# Patient Record
Sex: MALE | Race: WHITE | NOT HISPANIC OR LATINO | Employment: FULL TIME
[De-identification: names, ages, dates, MRNs, and addresses within clinical notes are randomized per-mention and may not be internally consistent; named-entity substitution may affect disease eponyms.]

---

## 2018-04-23 ENCOUNTER — RECORDS - HEALTHEAST (OUTPATIENT)
Dept: ADMINISTRATIVE | Facility: OTHER | Age: 58
End: 2018-04-23

## 2018-08-30 ENCOUNTER — HOSPITAL ENCOUNTER (OUTPATIENT)
Dept: MRI IMAGING | Facility: CLINIC | Age: 58
Discharge: HOME | End: 2018-08-30
Attending: PSYCHIATRY & NEUROLOGY

## 2018-08-30 ENCOUNTER — COMMUNICATION - HEALTHEAST (OUTPATIENT)
Dept: TELEHEALTH | Facility: CLINIC | Age: 58
End: 2018-08-30

## 2018-08-30 DIAGNOSIS — G35 MULTIPLE SCLEROSIS (H): ICD-10-CM

## 2019-01-31 ENCOUNTER — RECORDS - HEALTHEAST (OUTPATIENT)
Dept: ADMINISTRATIVE | Facility: OTHER | Age: 59
End: 2019-01-31

## 2019-02-01 ENCOUNTER — HOSPITAL ENCOUNTER (OUTPATIENT)
Dept: MRI IMAGING | Facility: CLINIC | Age: 59
Discharge: HOME | End: 2019-02-01
Attending: PSYCHIATRY & NEUROLOGY

## 2019-02-01 DIAGNOSIS — G35 MULTIPLE SCLEROSIS (H): ICD-10-CM

## 2019-02-06 ENCOUNTER — COMMUNICATION - HEALTHEAST (OUTPATIENT)
Dept: NEUROSURGERY | Facility: CLINIC | Age: 59
End: 2019-02-06

## 2019-02-06 ENCOUNTER — AMBULATORY - HEALTHEAST (OUTPATIENT)
Dept: NEUROSURGERY | Facility: CLINIC | Age: 59
End: 2019-02-06

## 2019-02-06 DIAGNOSIS — M54.9 BACK PAIN: ICD-10-CM

## 2019-02-12 ENCOUNTER — RECORDS - HEALTHEAST (OUTPATIENT)
Dept: ADMINISTRATIVE | Facility: OTHER | Age: 59
End: 2019-02-12

## 2019-02-13 ENCOUNTER — OFFICE VISIT - HEALTHEAST (OUTPATIENT)
Dept: NEUROSURGERY | Facility: CLINIC | Age: 59
End: 2019-02-13

## 2019-02-13 ENCOUNTER — HOSPITAL ENCOUNTER (OUTPATIENT)
Dept: RADIOLOGY | Facility: CLINIC | Age: 59
Discharge: HOME | End: 2019-02-13
Attending: NEUROLOGICAL SURGERY

## 2019-02-13 DIAGNOSIS — M54.9 BACK PAIN: ICD-10-CM

## 2019-02-13 DIAGNOSIS — M48.062 SPINAL STENOSIS OF LUMBAR REGION WITH NEUROGENIC CLAUDICATION: ICD-10-CM

## 2019-02-13 ASSESSMENT — MIFFLIN-ST. JEOR: SCORE: 1730.71

## 2019-02-15 ENCOUNTER — COMMUNICATION - HEALTHEAST (OUTPATIENT)
Dept: INTERNAL MEDICINE | Facility: CLINIC | Age: 59
End: 2019-02-15

## 2019-02-18 ENCOUNTER — RECORDS - HEALTHEAST (OUTPATIENT)
Dept: ADMINISTRATIVE | Facility: OTHER | Age: 59
End: 2019-02-18

## 2019-02-21 ENCOUNTER — OFFICE VISIT - HEALTHEAST (OUTPATIENT)
Dept: INTERNAL MEDICINE | Facility: CLINIC | Age: 59
End: 2019-02-21

## 2019-02-21 ENCOUNTER — COMMUNICATION - HEALTHEAST (OUTPATIENT)
Dept: NEUROSURGERY | Facility: CLINIC | Age: 59
End: 2019-02-21

## 2019-02-21 DIAGNOSIS — M48.062 SPINAL STENOSIS OF LUMBAR REGION WITH NEUROGENIC CLAUDICATION: ICD-10-CM

## 2019-02-21 DIAGNOSIS — R53.82 CHRONIC FATIGUE: ICD-10-CM

## 2019-02-21 DIAGNOSIS — Z01.818 PRE-OPERATIVE EXAMINATION: ICD-10-CM

## 2019-02-21 DIAGNOSIS — G35 MULTIPLE SCLEROSIS (H): ICD-10-CM

## 2019-02-21 DIAGNOSIS — Z12.11 SCREEN FOR COLON CANCER: ICD-10-CM

## 2019-02-21 LAB
ANION GAP SERPL CALCULATED.3IONS-SCNC: 10 MMOL/L (ref 5–18)
ATRIAL RATE - MUSE: 67 BPM
BUN SERPL-MCNC: 19 MG/DL (ref 8–22)
CALCIUM SERPL-MCNC: 9.5 MG/DL (ref 8.5–10.5)
CHLORIDE BLD-SCNC: 105 MMOL/L (ref 98–107)
CO2 SERPL-SCNC: 26 MMOL/L (ref 22–31)
CREAT SERPL-MCNC: 0.87 MG/DL (ref 0.7–1.3)
DIASTOLIC BLOOD PRESSURE - MUSE: NORMAL MMHG
ERYTHROCYTE [DISTWIDTH] IN BLOOD BY AUTOMATED COUNT: 12.2 % (ref 11–14.5)
GFR SERPL CREATININE-BSD FRML MDRD: >60 ML/MIN/1.73M2
GLUCOSE BLD-MCNC: 92 MG/DL (ref 70–125)
HCT VFR BLD AUTO: 44.2 % (ref 40–54)
HGB BLD-MCNC: 15.4 G/DL (ref 14–18)
INR PPP: 0.98 (ref 0.9–1.1)
INTERPRETATION ECG - MUSE: NORMAL
MCH RBC QN AUTO: 31.2 PG (ref 27–34)
MCHC RBC AUTO-ENTMCNC: 34.8 G/DL (ref 32–36)
MCV RBC AUTO: 90 FL (ref 80–100)
P AXIS - MUSE: 70 DEGREES
PLATELET # BLD AUTO: 232 THOU/UL (ref 140–440)
PMV BLD AUTO: 7.4 FL (ref 7–10)
POTASSIUM BLD-SCNC: 3.9 MMOL/L (ref 3.5–5)
PR INTERVAL - MUSE: 148 MS
QRS DURATION - MUSE: 104 MS
QT - MUSE: 412 MS
QTC - MUSE: 435 MS
R AXIS - MUSE: 90 DEGREES
RBC # BLD AUTO: 4.92 MILL/UL (ref 4.4–6.2)
SODIUM SERPL-SCNC: 141 MMOL/L (ref 136–145)
SYSTOLIC BLOOD PRESSURE - MUSE: NORMAL MMHG
T AXIS - MUSE: 56 DEGREES
VENTRICULAR RATE- MUSE: 67 BPM
WBC: 7.7 THOU/UL (ref 4–11)

## 2019-02-21 ASSESSMENT — MIFFLIN-ST. JEOR: SCORE: 1717.11

## 2019-02-22 ENCOUNTER — COMMUNICATION - HEALTHEAST (OUTPATIENT)
Dept: INTERNAL MEDICINE | Facility: CLINIC | Age: 59
End: 2019-02-22

## 2019-02-25 ENCOUNTER — COMMUNICATION - HEALTHEAST (OUTPATIENT)
Dept: NEUROSURGERY | Facility: CLINIC | Age: 59
End: 2019-02-25

## 2019-02-26 ENCOUNTER — SURGERY - HEALTHEAST (OUTPATIENT)
Dept: SURGERY | Facility: CLINIC | Age: 59
End: 2019-02-26

## 2019-02-26 ENCOUNTER — ANESTHESIA - HEALTHEAST (OUTPATIENT)
Dept: SURGERY | Facility: CLINIC | Age: 59
End: 2019-02-26

## 2019-02-26 ASSESSMENT — MIFFLIN-ST. JEOR: SCORE: 1708.03

## 2019-02-27 ENCOUNTER — COMMUNICATION - HEALTHEAST (OUTPATIENT)
Dept: NEUROSURGERY | Facility: CLINIC | Age: 59
End: 2019-02-27

## 2019-03-12 ENCOUNTER — AMBULATORY - HEALTHEAST (OUTPATIENT)
Dept: NEUROSURGERY | Facility: CLINIC | Age: 59
End: 2019-03-12

## 2019-04-05 ENCOUNTER — OFFICE VISIT - HEALTHEAST (OUTPATIENT)
Dept: INTERNAL MEDICINE | Facility: CLINIC | Age: 59
End: 2019-04-05

## 2019-04-05 DIAGNOSIS — M48.062 SPINAL STENOSIS OF LUMBAR REGION WITH NEUROGENIC CLAUDICATION: ICD-10-CM

## 2019-04-05 DIAGNOSIS — Z13.220 SCREENING FOR HYPERLIPIDEMIA: ICD-10-CM

## 2019-04-05 DIAGNOSIS — Z12.5 SCREENING FOR PROSTATE CANCER: ICD-10-CM

## 2019-04-05 DIAGNOSIS — Z00.00 ROUTINE GENERAL MEDICAL EXAMINATION AT A HEALTH CARE FACILITY: ICD-10-CM

## 2019-04-05 DIAGNOSIS — Z12.2 ENCOUNTER FOR SCREENING FOR LUNG CANCER: ICD-10-CM

## 2019-04-05 DIAGNOSIS — R53.82 CHRONIC FATIGUE: ICD-10-CM

## 2019-04-05 DIAGNOSIS — Z23 IMMUNIZATION DUE: ICD-10-CM

## 2019-04-05 DIAGNOSIS — Z13.1 SCREENING FOR DIABETES MELLITUS: ICD-10-CM

## 2019-04-05 DIAGNOSIS — G35 MULTIPLE SCLEROSIS (H): ICD-10-CM

## 2019-04-05 DIAGNOSIS — Z11.59 NEED FOR HEPATITIS C SCREENING TEST: ICD-10-CM

## 2019-04-05 LAB
ALBUMIN UR-MCNC: NEGATIVE MG/DL
APPEARANCE UR: CLEAR
BILIRUB UR QL STRIP: NEGATIVE
CHOLEST SERPL-MCNC: 216 MG/DL
COLOR UR AUTO: YELLOW
FASTING STATUS PATIENT QL REPORTED: ABNORMAL
GLUCOSE UR STRIP-MCNC: NEGATIVE MG/DL
HBA1C MFR BLD: 5.1 % (ref 3.5–6)
HDLC SERPL-MCNC: 47 MG/DL
HGB UR QL STRIP: NEGATIVE
KETONES UR STRIP-MCNC: NEGATIVE MG/DL
LDLC SERPL CALC-MCNC: 125 MG/DL
LEUKOCYTE ESTERASE UR QL STRIP: NEGATIVE
NITRATE UR QL: NEGATIVE
PH UR STRIP: 7 [PH] (ref 5–8)
SP GR UR STRIP: 1.02 (ref 1–1.03)
TRIGL SERPL-MCNC: 220 MG/DL
TSH SERPL DL<=0.005 MIU/L-ACNC: 1.21 UIU/ML (ref 0.3–5)
UROBILINOGEN UR STRIP-ACNC: NORMAL

## 2019-04-05 ASSESSMENT — MIFFLIN-ST. JEOR: SCORE: 1753.39

## 2019-04-06 LAB
HCV AB SERPL QL IA: NEGATIVE
PSA SERPL-MCNC: 0.6 NG/ML (ref 0–3.5)

## 2019-04-09 ENCOUNTER — COMMUNICATION - HEALTHEAST (OUTPATIENT)
Dept: INTERNAL MEDICINE | Facility: CLINIC | Age: 59
End: 2019-04-09

## 2019-04-09 ENCOUNTER — HOSPITAL ENCOUNTER (OUTPATIENT)
Dept: CT IMAGING | Facility: HOSPITAL | Age: 59
Discharge: HOME | End: 2019-04-09
Attending: INTERNAL MEDICINE

## 2019-04-09 DIAGNOSIS — Z12.2 ENCOUNTER FOR SCREENING FOR LUNG CANCER: ICD-10-CM

## 2019-04-10 ENCOUNTER — COMMUNICATION - HEALTHEAST (OUTPATIENT)
Dept: INTERNAL MEDICINE | Facility: CLINIC | Age: 59
End: 2019-04-10

## 2019-04-10 DIAGNOSIS — E78.2 MIXED HYPERLIPIDEMIA: ICD-10-CM

## 2019-04-16 ENCOUNTER — COMMUNICATION - HEALTHEAST (OUTPATIENT)
Dept: NEUROSURGERY | Facility: CLINIC | Age: 59
End: 2019-04-16

## 2019-04-16 ENCOUNTER — OFFICE VISIT - HEALTHEAST (OUTPATIENT)
Dept: NEUROSURGERY | Facility: CLINIC | Age: 59
End: 2019-04-16

## 2019-04-16 DIAGNOSIS — M48.062 SPINAL STENOSIS OF LUMBAR REGION WITH NEUROGENIC CLAUDICATION: ICD-10-CM

## 2019-04-22 ENCOUNTER — OFFICE VISIT - HEALTHEAST (OUTPATIENT)
Dept: PHYSICAL THERAPY | Facility: REHABILITATION | Age: 59
End: 2019-04-22

## 2019-04-22 DIAGNOSIS — M48.062 SPINAL STENOSIS OF LUMBAR REGION WITH NEUROGENIC CLAUDICATION: ICD-10-CM

## 2019-04-22 DIAGNOSIS — M62.81 GENERALIZED MUSCLE WEAKNESS: ICD-10-CM

## 2019-04-24 ENCOUNTER — OFFICE VISIT - HEALTHEAST (OUTPATIENT)
Dept: PHYSICAL THERAPY | Facility: REHABILITATION | Age: 59
End: 2019-04-24

## 2019-04-24 DIAGNOSIS — M48.062 SPINAL STENOSIS OF LUMBAR REGION WITH NEUROGENIC CLAUDICATION: ICD-10-CM

## 2019-04-24 DIAGNOSIS — M62.81 GENERALIZED MUSCLE WEAKNESS: ICD-10-CM

## 2019-04-29 ENCOUNTER — OFFICE VISIT - HEALTHEAST (OUTPATIENT)
Dept: PHYSICAL THERAPY | Facility: REHABILITATION | Age: 59
End: 2019-04-29

## 2019-04-29 DIAGNOSIS — M48.062 SPINAL STENOSIS OF LUMBAR REGION WITH NEUROGENIC CLAUDICATION: ICD-10-CM

## 2019-04-29 DIAGNOSIS — M62.81 GENERALIZED MUSCLE WEAKNESS: ICD-10-CM

## 2019-05-01 ENCOUNTER — OFFICE VISIT - HEALTHEAST (OUTPATIENT)
Dept: PHYSICAL THERAPY | Facility: REHABILITATION | Age: 59
End: 2019-05-01

## 2019-05-01 DIAGNOSIS — M48.062 SPINAL STENOSIS OF LUMBAR REGION WITH NEUROGENIC CLAUDICATION: ICD-10-CM

## 2019-05-01 DIAGNOSIS — M62.81 GENERALIZED MUSCLE WEAKNESS: ICD-10-CM

## 2019-05-06 ENCOUNTER — OFFICE VISIT - HEALTHEAST (OUTPATIENT)
Dept: PHYSICAL THERAPY | Facility: REHABILITATION | Age: 59
End: 2019-05-06

## 2019-05-06 DIAGNOSIS — M48.062 SPINAL STENOSIS OF LUMBAR REGION WITH NEUROGENIC CLAUDICATION: ICD-10-CM

## 2019-05-06 DIAGNOSIS — M62.81 GENERALIZED MUSCLE WEAKNESS: ICD-10-CM

## 2019-05-08 ENCOUNTER — OFFICE VISIT - HEALTHEAST (OUTPATIENT)
Dept: PHYSICAL THERAPY | Facility: REHABILITATION | Age: 59
End: 2019-05-08

## 2019-05-08 DIAGNOSIS — M48.062 SPINAL STENOSIS OF LUMBAR REGION WITH NEUROGENIC CLAUDICATION: ICD-10-CM

## 2019-05-08 DIAGNOSIS — M62.81 GENERALIZED MUSCLE WEAKNESS: ICD-10-CM

## 2019-05-13 ENCOUNTER — OFFICE VISIT - HEALTHEAST (OUTPATIENT)
Dept: PHYSICAL THERAPY | Facility: REHABILITATION | Age: 59
End: 2019-05-13

## 2019-05-13 DIAGNOSIS — M62.81 GENERALIZED MUSCLE WEAKNESS: ICD-10-CM

## 2019-05-13 DIAGNOSIS — M48.062 SPINAL STENOSIS OF LUMBAR REGION WITH NEUROGENIC CLAUDICATION: ICD-10-CM

## 2019-05-14 ENCOUNTER — OFFICE VISIT - HEALTHEAST (OUTPATIENT)
Dept: NEUROSURGERY | Facility: CLINIC | Age: 59
End: 2019-05-14

## 2019-05-14 DIAGNOSIS — M48.062 SPINAL STENOSIS OF LUMBAR REGION WITH NEUROGENIC CLAUDICATION: ICD-10-CM

## 2020-04-04 ENCOUNTER — COMMUNICATION - HEALTHEAST (OUTPATIENT)
Dept: INTERNAL MEDICINE | Facility: CLINIC | Age: 60
End: 2020-04-04

## 2020-04-04 DIAGNOSIS — E78.2 MIXED HYPERLIPIDEMIA: ICD-10-CM

## 2020-06-16 PROBLEM — G35 MULTIPLE SCLEROSIS (H): Status: ACTIVE | Noted: 2019-02-21

## 2020-06-16 RX ORDER — GLATIRAMER 40 MG/ML
40 INJECTION, SOLUTION SUBCUTANEOUS
COMMUNITY
Start: 2019-01-25 | End: 2020-06-24

## 2020-06-16 RX ORDER — ATORVASTATIN CALCIUM 20 MG/1
20 TABLET, FILM COATED ORAL
COMMUNITY
Start: 2020-04-07 | End: 2021-08-20

## 2020-06-16 RX ORDER — MODAFINIL 200 MG/1
200 TABLET ORAL 2 TIMES DAILY
COMMUNITY
Start: 2019-02-07 | End: 2020-06-24

## 2020-06-16 SDOH — HEALTH STABILITY: MENTAL HEALTH: HOW OFTEN DO YOU HAVE A DRINK CONTAINING ALCOHOL?: MONTHLY OR LESS

## 2020-06-16 NOTE — TELEPHONE ENCOUNTER
Sent in [facility], would not let me send in [facility] as no previous enounter   Dorie Lopez, BERNA on 6/16/2020 at 1:56 PM

## 2020-06-24 ENCOUNTER — RECORDS - HEALTHEAST (OUTPATIENT)
Dept: ADMINISTRATIVE | Facility: OTHER | Age: 60
End: 2020-06-24

## 2020-06-24 ENCOUNTER — OFFICE VISIT (OUTPATIENT)
Dept: NEUROLOGY | Facility: CLINIC | Age: 60
End: 2020-06-24
Payer: COMMERCIAL

## 2020-06-24 VITALS — BODY MASS INDEX: 25.03 KG/M2 | HEIGHT: 74 IN | WEIGHT: 195 LBS

## 2020-06-24 DIAGNOSIS — G35 MULTIPLE SCLEROSIS (H): Primary | ICD-10-CM

## 2020-06-24 PROBLEM — F32.A DEPRESSION: Status: ACTIVE | Noted: 2020-06-24

## 2020-06-24 PROBLEM — Z72.0 TOBACCO USER: Status: ACTIVE | Noted: 2020-06-24

## 2020-06-24 PROBLEM — M48.062 SPINAL STENOSIS OF LUMBAR REGION WITH NEUROGENIC CLAUDICATION: Status: ACTIVE | Noted: 2019-02-21

## 2020-06-24 PROCEDURE — 99214 OFFICE O/P EST MOD 30 MIN: CPT | Performed by: PSYCHIATRY & NEUROLOGY

## 2020-06-24 RX ORDER — MODAFINIL 200 MG/1
200 TABLET ORAL 2 TIMES DAILY
Qty: 60 TABLET | Refills: 5 | Status: SHIPPED | OUTPATIENT
Start: 2020-06-24 | End: 2020-09-25

## 2020-06-24 RX ORDER — GLATIRAMER 40 MG/ML
40 INJECTION, SOLUTION SUBCUTANEOUS
COMMUNITY
End: 2022-06-21

## 2020-06-24 RX ORDER — GLATIRAMER 40 MG/ML
40 INJECTION, SOLUTION SUBCUTANEOUS
Qty: 12 SYRINGE | Refills: 11 | Status: SHIPPED | OUTPATIENT
Start: 2020-06-24 | End: 2021-01-13

## 2020-06-24 ASSESSMENT — MIFFLIN-ST. JEOR: SCORE: 1766.32

## 2020-06-24 NOTE — PROGRESS NOTES
NEUROLOGY FOLLOW UP VISIT  NOTE       [facility] NEUROLOGY [place]  [address] [place] [address]  Tel: [phone]  Fax: [phone]  [email or web address]     Olegario Grady,  1960, MRN [id number]  PCP: [place], [phone]  Date: 2020     ASSESSMENT & PLAN     Diagnosis code: Multiple sclerosis (H)     Multiple sclerosis  Patient is a pleasant 59-year-old male with history of multiple sclerosis stable on Copaxone.  His most recent imaging studies were in 2018 and no new lesions were noted.  He has not experienced any symptoms and is somewhat reluctant going for repeat imaging studies during the pandemic.  As noted below he went through L3-L4 laminectomy last year and has not experienced any lower back symptoms.  He denies any focal weakness, optic neuritis or any bladder symptoms.  I have recommended:    Continue Copaxone 40 mg subcu 3 times weekly.  I refilled his prescription, gave him 30-day supply with 11 refills    Continue Provigil 200 mg twice daily for fatigue.  I refilled his prescription, gave him 30-day supply with 6 refills    He also struggles with depression but has found Zoloft to be helpful and I have refilled her prescription.    As his symptoms are stable and due to the pandemic patient wants to hold off on imaging studies and I plan on repeating MRI of the head, cervical and thoracic spine next year on his follow-up.    Follow-up will be in 1 year    Thank you again for this referral, please feel free to contact me if you have any questions.    Ross Cabello MD  [facility]  (Former[facility], [facility]     HISTORY OF PRESENT ILLNESS     Patient is a 59-year-old male with history of multiple sclerosis and depression returns for yearly follow-up.  He denies any flareups since his last visit.  Continues to struggle with fatigue that usually responds to Provigil.  For depression is taking Zoloft.  He  denies any bladder symptoms.  He did have MRI of his head and spine 2 years ago that was unchanged.    Last year he presented with complaint of increased lower back pain and worsening strength after he ambulated for short distance.  I was concerned about spinal stenosis and he had a MRI of the lumbar spine that showed severe L3-L4 stenosis and had laminectomy done with significant improvement.  He is continuing on Copaxone and has no specific complaints.  He wants medication refilled.  Although it is been more than 2 years since he had his imaging studies due to the pandemic he is somewhat reluctant getting the testing done and is wondering if he can hold off for 1 year     PROBLEM LIST   Patient Active Problem List    Diagnosis Date Noted     Depression 06/24/2020     Priority: Medium     Tobacco user 06/24/2020     Priority: Medium     Multiple sclerosis (H) 02/21/2019     Priority: Medium     Spinal stenosis of lumbar region with neurogenic claudication 02/21/2019     Priority: Medium        PAST MEDICAL & SURGICAL HISTORY     Past Medical History:   Patient  has no past medical history on file.    Surgical History:  He  has a past surgical history that includes Laminectomy lumbar one level.     SOCIAL HISTORY     Reviewed, and he  reports that he has quit smoking. He has never used smokeless tobacco. He reports current alcohol use.     FAMILY HISTORY     Reviewed, and family history includes Coronary Artery Disease in his father; Hyperlipidemia in his father; Hypertension in his father.     ALLERGIES     No Known Allergies      REVIEW OF SYSTEMS     A 12 point review of system was performed and was negative except as outlined in the history of present illness.     HOME MEDICATIONS     Prior to Admission medications    Medication Sig Start Date End Date Taking? Authorizing Provider   atorvastatin (LIPITOR) 20 MG tablet Take 20 mg by mouth 4/7/20  Yes Reported, Patient   Glatiramer Acetate 40 MG/ML SOSY Inject 40  "mg Subcutaneous three times a week   Yes Ross Cabello MD   Glatiramer Acetate 40 MG/ML SOSY Inject 40 mg Subcutaneous 1/25/19 6/18/20 Yes Reported, Patient   modafinil (PROVIGIL) 200 MG tablet Take 200 mg by mouth 2 times daily  2/7/19  Yes Ross Cabello MD   MULTIPLE VITAMIN PO Take 1 tablet by mouth   Yes Reported, Patient   sertraline (ZOLOFT) 50 MG tablet Take 50 mg by mouth daily  1/26/19  Yes Ross Cabello MD   Vitamin D3 (CHOLECALCIFEROL) 125 MCG (5000 UT) tablet Take 5,000 Units by mouth 2/21/19  Yes Reported, Patient         PHYSICAL EXAM     Vital signs  Ht 1.867 m (6' 1.5\")   Wt 88.5 kg (195 lb)   BMI 25.38 kg/m      Weight:   195 lbs 0 oz    General Physical Exam: Patient is alert and oriented x 3. Vital signs were reviewed and are documented in EMR. Neck was supple,.  Neurological Exam:  Patient is alert and oriented x3 speech was normal with no dysarthria or aphasia.  Cranial nerves II through XII are intact he is able to move all 4 extremities.  No dysmetria noted on finger-nose testing gait testing normal     DIAGNOSTIC STUDIES     PERTINENT RADIOLOGY  Following imaging studies were reviewed   L SPINE 2/1/19  1. Canal decompression and posterior instrumented fusion L4-L5. Canal is   well decompressed dorsally without canal stenosis. Neural foramina are   partially obscured without findings for high-grade foraminal stenosis.    2. Although the neural foramina are significantly obscured by   susceptibility artifact at L5-S1, there appears to be severe right and   moderate left neural foraminal stenosis at this level.     3. Severe spinal canal stenosis L3-L4 with left greater than right   lateral recess stenosis. Severe left and more moderate right neural   foraminal stenosis.     4. At L2-L3, there is moderate spinal canal stenosis without significant   foraminal stenosis.    MRI BRAIN: 8/30/18  1. A few small nonspecific white matter hyperintensities are new versus   the prior exam but do not " enhance compatible with areas of now chronic   demyelination that have occurred in the interval. No pathologic   enhancement to indicate active demyelination   in the brain.  2. No superimposed acute intracranial finding.    MRI CERVICAL SPINE:  1. Small focus of nonenhancing T2 hyperintensity in the right spinal cord   at the C5-C6 level compatible with focus of chronic demyelination is   unchanged from prior. No new spinal cord signal abnormality. No pathologic   enhancement.    MRI THORACIC SPINE:  1. Multiple patchy areas of nonenhancing T2 signal hyperintensity within   the thoracic spinal cord are not definitely changed versus prior.  2. No pathologic enhancement to indicate active demyelination in the   thoracic spinal cord.    PERTINENT LABS  Following labs were reviewed  No visits with results within 6 Month(s) from this visit.   Latest known visit with results is:   Hospital Laboratory on 09/27/2011   Component Date Value     Hemoglobin 09/27/2011 12.8*         Total time spent for face to face visit, reviewing labs/imaging studies, counseling and coordination of care was: 30 Minutes More than 50% of this time was spent on counseling and coordination of care.      This note was dictated using voice recognition software.  Any grammatical or context distortions are unintentional and inherent to the software.

## 2020-06-24 NOTE — PATIENT INSTRUCTIONS
Patient Education     Discharge Instructions for Multiple Sclerosis  You have been diagnosed with multiple sclerosis (MS). This is a disease of the brain, the spinal cord, or both. MS causes the destruction of the covering of the nerves. This covering is called the myelin sheath. When the nerves are damaged, messages from the brain are not passed on very well. You may not be able to move your body as well as you did before. You may lose some of your ability to feel things, such as heat or cold. Some people may have vision problems or trouble emptying their bladder. Here are some things you can do to feel better.  Activity  Do's and don'ts:     Get plenty of rest. Extreme tiredness is a common symptom of MS.    Plan your activities in advance.    Stay out of excessive heat.    Use a cane or other aid to help you get around and save your energy, if needed.    Try stretching. It can be useful to help ease stiff muscles.      Get exercise. Aerobic exercise may help your strength, muscle tone, balance, and coordination. A physical therapist can help you learn which exercises are safe for you.      Try swimming. It may be a good exercise in which your temperature doesn't go up. But don't swim alone.    Other home care  More do's and don'ts:     Take your medicine exactly as directed by your healthcare provider. Don t skip doses.    Use hot tubs or long hot baths with caution. If you soak too long in hot water, your muscles may become weak. Don t get in a hot tub unless there s someone nearby who can pull you out if needed.    If you get too hot and your symptoms get worse, cool down for a few hours. This will help you return to normal.    Put an air-conditioning system in your home if you don t already have one.    Eat a well-balanced diet. Talk to your healthcare provider about taking vitamins.    Prevent constipation. To do this:  ? Eat a diet high in fiber.  ? Drink 6 to 8 glasses of water every day, unless directed  otherwise.  ? Use an over-the-counter laxative as directed by your healthcare provider.    Let your healthcare provider know:  ? About any pain you are having  ? About any sexual issues you are having  ? If you laugh or cry with no reason (such as cry when you are really happy)  ? If you feel sad or depressed  ? If you lose control of your urine (incontinence)  Follow-up  Make a follow-up appointment with your healthcare provider, or as advised.  When to call your healthcare provider  Call your healthcare provider right away if you have any of the following:    Extreme tiredness or increasing weakness    Confusion or unusual behavior    New nervous system symptoms, such as weakness or numbness in the face, arms, or legs    Double vision or loss of vision    Trouble urinating or change in the color or odor of your urine    Fever over 100.4 F (38 C)  Date Last Reviewed: 4/1/2018 2000-2019 [employer]. [address] [place], [place] [address]. All rights reserved. This information is not intended as a substitute for professional medical care. Always follow your healthcare professional's instructions.

## 2020-06-24 NOTE — NURSING NOTE
Chief Complaint   Patient presents with     Multiple Sclerosis     Annual follow up - doing well      Video Visit     Email: [email or web address]     Dorie Lopez CMA on 6/24/2020 at 2:22 PM

## 2020-06-24 NOTE — LETTER
2020         RE: Olegario Grady  [address] [id number]        Dear Colleague,    Thank you for referring your patient, Olegario Grady, to the [facility] NEUROLOG[facility] [place]. Please see a copy of my visit note below.    NEUROLOGY FOLLOW UP VISIT  NOTE       [facility] NEUROLOG[facility] [place]  [address] [place] [address]  Tel: [phone]  Fax: [phone]  [email or web address]     Olegario Grady,  1960, MRN [id number]  PCP: [place], [phone]  Date: 2020     ASSESSMENT & PLAN     Diagnosis code: Multiple sclerosis (H)     Multiple sclerosis  Patient is a pleasant 59-year-old male with history of multiple sclerosis stable on Copaxone.  His most recent imaging studies were in 2018 and no new lesions were noted.  He has not experienced any symptoms and is somewhat reluctant going for repeat imaging studies during the pandemic.  As noted below he went through L3-L4 laminectomy last year and has not experienced any lower back symptoms.  He denies any focal weakness, optic neuritis or any bladder symptoms.  I have recommended:    Continue Copaxone 40 mg subcu 3 times weekly.  I refilled his prescription, gave him 30-day supply with 11 refills    Continue Provigil 200 mg twice daily for fatigue.  I refilled his prescription, gave him 30-day supply with 6 refills    He also struggles with depression but has found Zoloft to be helpful and I have refilled her prescription.    As his symptoms are stable and due to the pandemic patient wants to hold off on imaging studies and I plan on repeating MRI of the head, cervical and thoracic spine next year on his follow-up.    Follow-up will be in 1 year    Thank you again for this referral, please feel free to contact me if you have any questions.    Ross Cabello MD  [facility]  ([facility], [facility]     HISTORY OF PRESENT ILLNESS      Patient is a 59-year-old male with history of multiple sclerosis and depression returns for yearly follow-up.  He denies any flareups since his last visit.  Continues to struggle with fatigue that usually responds to Provigil.  For depression is taking Zoloft.  He denies any bladder symptoms.  He did have MRI of his head and spine 2 years ago that was unchanged.    Last year he presented with complaint of increased lower back pain and worsening strength after he ambulated for short distance.  I was concerned about spinal stenosis and he had a MRI of the lumbar spine that showed severe L3-L4 stenosis and had laminectomy done with significant improvement.  He is continuing on Copaxone and has no specific complaints.  He wants medication refilled.  Although it is been more than 2 years since he had his imaging studies due to the pandemic he is somewhat reluctant getting the testing done and is wondering if he can hold off for 1 year     PROBLEM LIST   Patient Active Problem List    Diagnosis Date Noted     Depression 06/24/2020     Priority: Medium     Tobacco user 06/24/2020     Priority: Medium     Multiple sclerosis (H) 02/21/2019     Priority: Medium     Spinal stenosis of lumbar region with neurogenic claudication 02/21/2019     Priority: Medium        PAST MEDICAL & SURGICAL HISTORY     Past Medical History:   Patient  has no past medical history on file.    Surgical History:  He  has a past surgical history that includes Laminectomy lumbar one level.     SOCIAL HISTORY     Reviewed, and he  reports that he has quit smoking. He has never used smokeless tobacco. He reports current alcohol use.     FAMILY HISTORY     Reviewed, and family history includes Coronary Artery Disease in his father; Hyperlipidemia in his father; Hypertension in his father.     ALLERGIES     No Known Allergies      REVIEW OF SYSTEMS     A 12 point review of system was performed and was negative except as outlined in the history of  "present illness.     HOME MEDICATIONS     Prior to Admission medications    Medication Sig Start Date End Date Taking? Authorizing Provider   atorvastatin (LIPITOR) 20 MG tablet Take 20 mg by mouth 4/7/20  Yes Reported, Patient   Glatiramer Acetate 40 MG/ML SOSY Inject 40 mg Subcutaneous three times a week   Yes Ross Cabello MD   Glatiramer Acetate 40 MG/ML SOSY Inject 40 mg Subcutaneous 1/25/19 6/18/20 Yes Reported, Patient   modafinil (PROVIGIL) 200 MG tablet Take 200 mg by mouth 2 times daily  2/7/19  Yes Ross Cabello MD   MULTIPLE VITAMIN PO Take 1 tablet by mouth   Yes Reported, Patient   sertraline (ZOLOFT) 50 MG tablet Take 50 mg by mouth daily  1/26/19  Yes Ross Cabello MD   Vitamin D3 (CHOLECALCIFEROL) 125 MCG (5000 UT) tablet Take 5,000 Units by mouth 2/21/19  Yes Reported, Patient         PHYSICAL EXAM     Vital signs  Ht 1.867 m (6' 1.5\")   Wt 88.5 kg (195 lb)   BMI 25.38 kg/m      Weight:   195 lbs 0 oz    General Physical Exam: Patient is alert and oriented x 3. Vital signs were reviewed and are documented in EMR. Neck was supple,.  Neurological Exam:  Patient is alert and oriented x3 speech was normal with no dysarthria or aphasia.  Cranial nerves II through XII are intact he is able to move all 4 extremities.  No dysmetria noted on finger-nose testing gait testing normal     DIAGNOSTIC STUDIES     PERTINENT RADIOLOGY  Following imaging studies were reviewed   L SPINE 2/1/19  1. Canal decompression and posterior instrumented fusion L4-L5. Canal is   well decompressed dorsally without canal stenosis. Neural foramina are   partially obscured without findings for high-grade foraminal stenosis.    2. Although the neural foramina are significantly obscured by   susceptibility artifact at L5-S1, there appears to be severe right and   moderate left neural foraminal stenosis at this level.     3. Severe spinal canal stenosis L3-L4 with left greater than right   lateral recess stenosis. Severe left and " more moderate right neural   foraminal stenosis.     4. At L2-L3, there is moderate spinal canal stenosis without significant   foraminal stenosis.    MRI BRAIN: 8/30/18  1. A few small nonspecific white matter hyperintensities are new versus   the prior exam but do not enhance compatible with areas of now chronic   demyelination that have occurred in the interval. No pathologic   enhancement to indicate active demyelination   in the brain.  2. No superimposed acute intracranial finding.    MRI CERVICAL SPINE:  1. Small focus of nonenhancing T2 hyperintensity in the right spinal cord   at the C5-C6 level compatible with focus of chronic demyelination is   unchanged from prior. No new spinal cord signal abnormality. No pathologic   enhancement.    MRI THORACIC SPINE:  1. Multiple patchy areas of nonenhancing T2 signal hyperintensity within   the thoracic spinal cord are not definitely changed versus prior.  2. No pathologic enhancement to indicate active demyelination in the   thoracic spinal cord.    PERTINENT LABS  Following labs were reviewed  No visits with results within 6 Month(s) from this visit.   Latest known visit with results is:   Hospital Laboratory on 09/27/2011   Component Date Value     Hemoglobin 09/27/2011 12.8*         Total time spent for face to face visit, reviewing labs/imaging studies, counseling and coordination of care was: 30 Minutes More than 50% of this time was spent on counseling and coordination of care.      This note was dictated using voice recognition software.  Any grammatical or context distortions are unintentional and inherent to the software.               Again, thank you for allowing me to participate in the care of your patient.        Sincerely,        Ross Cabello MD

## 2020-07-15 ENCOUNTER — COMMUNICATION - HEALTHEAST (OUTPATIENT)
Dept: PULMONOLOGY | Facility: OTHER | Age: 60
End: 2020-07-15

## 2020-07-27 DIAGNOSIS — G35 MULTIPLE SCLEROSIS (H): ICD-10-CM

## 2020-07-27 NOTE — TELEPHONE ENCOUNTER
Need to send Zoloft rx to [facility], not [facility]  Medication T'd for review and signature'  Dorie Lopez, CMA on 7/27/2020 at 11:56 AM

## 2020-08-20 ENCOUNTER — COMMUNICATION - HEALTHEAST (OUTPATIENT)
Dept: PULMONOLOGY | Facility: OTHER | Age: 60
End: 2020-08-20

## 2020-09-23 DIAGNOSIS — G35 MULTIPLE SCLEROSIS (H): ICD-10-CM

## 2020-09-25 RX ORDER — MODAFINIL 200 MG/1
200 TABLET ORAL 2 TIMES DAILY
Qty: 60 TABLET | Refills: 5 | Status: SHIPPED | OUTPATIENT
Start: 2020-09-25 | End: 2021-05-05

## 2020-09-25 NOTE — TELEPHONE ENCOUNTER
Refill request for Modafinil. Last rx was sent to [facility] in error.   Medication T'd for review and signature  Dorie Lopez CMA on 9/25/2020 at 9:48 AM

## 2021-01-12 DIAGNOSIS — G35 MULTIPLE SCLEROSIS (H): ICD-10-CM

## 2021-01-12 NOTE — TELEPHONE ENCOUNTER
Pt called to request that his Copaxone Rx go to a new pharm at [facility]. Any questions call pt at [phone]

## 2021-01-13 RX ORDER — GLATIRAMER 40 MG/ML
40 INJECTION, SOLUTION SUBCUTANEOUS
Qty: 12 SYRINGE | Refills: 11 | Status: SHIPPED | OUTPATIENT
Start: 2021-01-13 | End: 2021-06-24

## 2021-01-14 ENCOUNTER — TELEPHONE (OUTPATIENT)
Dept: NEUROLOGY | Facility: CLINIC | Age: 61
End: 2021-01-14

## 2021-01-14 NOTE — TELEPHONE ENCOUNTER
Spoke with patient and he would like to use [facility]. He has $0 copay card but when I called [facility] they only had Copaxone copay card on file. Called [facility] to see if patient was enrolled in copay card and they could not pull up the patient. Patient needs to call to enroll - informed him

## 2021-01-15 ENCOUNTER — HEALTH MAINTENANCE LETTER (OUTPATIENT)
Age: 61
End: 2021-01-15

## 2021-01-24 ENCOUNTER — COMMUNICATION - HEALTHEAST (OUTPATIENT)
Dept: INTERNAL MEDICINE | Facility: CLINIC | Age: 61
End: 2021-01-24

## 2021-01-24 DIAGNOSIS — E78.2 MIXED HYPERLIPIDEMIA: ICD-10-CM

## 2021-02-02 ENCOUNTER — COMMUNICATION - HEALTHEAST (OUTPATIENT)
Dept: INTERNAL MEDICINE | Facility: CLINIC | Age: 61
End: 2021-02-02

## 2021-02-02 DIAGNOSIS — E78.2 MIXED HYPERLIPIDEMIA: ICD-10-CM

## 2021-05-05 DIAGNOSIS — G35 MULTIPLE SCLEROSIS (H): ICD-10-CM

## 2021-05-05 RX ORDER — MODAFINIL 200 MG/1
200 TABLET ORAL 2 TIMES DAILY
Qty: 60 TABLET | Refills: 0 | Status: SHIPPED | OUTPATIENT
Start: 2021-05-05 | End: 2021-06-24

## 2021-05-27 ENCOUNTER — RECORDS - HEALTHEAST (OUTPATIENT)
Dept: ADMINISTRATIVE | Facility: CLINIC | Age: 61
End: 2021-05-27

## 2021-05-27 NOTE — PROGRESS NOTES
Office Visit - Physical   Olegario Grady   58 y.o.  male    Date of visit: 4/5/2019  Physician: Cam Hill MD     Assessment and Plan   1. Routine general medical examination at a health care facility  Is a generally healthy 58-year-old man with issues as discussed below.  Ongoing healthy lifestyle discussed and recommended including healthy diet and regular exercise.    2. Immunization due  - Tdap vaccine,  8yo or older,  IM    3. Screening for diabetes mellitus  - Glycosylated Hemoglobin A1c    4. Screening for hyperlipidemia  - Lipid Cascade    5. Spinal stenosis of lumbar region with neurogenic claudication  Doing well status post laminectomy    6. Chronic fatigue  Secondary to MS, is on Provigil  - Urinalysis-UC if Indicated  - Thyroid Cascade    7. Multiple sclerosis (H) - Cabello  Stable on Copaxone, Dr. Cabello    8. Need for hepatitis C screening test  - Hepatitis C Antibody (Anti-HCV)    9. Encounter for screening for lung cancer  Lung Cancer Screening pre-scan counseling Visit    The patient fits the risk profile of patients who benefit from this screening:  -The patient is >55 years old and <80 years old  -The patient has 30 pack year history (over 30)  -The patient has smoked within the past 15 years  -The patient has no medical comorbidity severe enough that it would cause mortality prior to mortality due to the lung cancer attempting to be detected.    Discussion with patient regarding the harms associated with LDCT screening include false-negative and false-positive results, incidental findings, overdiagnosis, and radiation exposure were reviewed at length.   The patient understands that pursuing this screening test may result in a biopsy that was not necessary. It may also produced added stress over a nodule that is likely not cancer.    Of 100 patients who get screening, 25 will have a positive scan. Of those 25, only 1 will have cancer.  Overdiagnosis is estimated at 10% of patients--  they would not have been detected in the patient's lifetime without screening. Less than 1% of patients likely had death related to radiation exposure increase.   Average low-dose CT associated with 0.61 to 1.5 mSv. Annual background radiation exposure in the [place] averages 2.4 mS; mammogram is 0.7mSv.    The benefits are reduction in risk of death from lung cancer. The number needed to treat is 320 (for every 320 patients who undergo screening, 1 patient will have a benefit in mortality from early detection from the screening).    Undergoing this screening implies willingness to pursue further potentially invasive testing to discover potential cancer.    All questions were answered.    The patient was counseled regarding smoking cessation and its risk for lung cancer.    - CT Low Dose Lung Screening Chest; Future    10. Screening for prostate cancer  - PSA (Prostatic-Specific Antigen), Annual Screen      Return in about 1 year (around 4/5/2020) for annual physical.     Chief Complaint   Chief Complaint   Patient presents with     Annual Exam     fasting        Patient Profile   Social History     Social History Narrative    Lives with his wife, Anastasia.  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  Four adult children.        Past Medical History   Patient Active Problem List   Diagnosis     Multiple sclerosis (H) - Cabello     Chronic fatigue     Spinal stenosis of lumbar region with neurogenic claudication       Past Surgical History  He has a past surgical history that includes Posterior laminectomy / decompression lumbar spine; Lumbar fusion; Rotator cuff repair (Right); ORIF femur fracture (Right); and Lumbar laminectomy (Bilateral, 2/26/2019).     History of Present Illness   This 58 y.o. old man comes in for annual physical.  His medical history is reviewed, electronic medical record is obtained reflectance no.  Overall doing well.  Has had a nice improvement in symptoms after lumbar laminectomy.  Reviewed his smoking  "history.  He has essentially a 30-pack-year history and quit smoking in .    Review of Systems: A comprehensive review of systems was negative except as noted.     Medications and Allergies   Current Outpatient Medications   Medication Sig Dispense Refill     cholecalciferol, vitamin D3, (VITAMIN D3) 5,000 unit Tab Take 1 tablet (5,000 Units total) by mouth daily.  0     COPAXONE 40 mg/mL Syrg injection Inject 40 mg under the skin 3 (three) times a week.         10     modafinil (PROVIGIL) 200 MG tablet Take 400 mg by mouth daily.         5     multivitamin therapeutic tablet Take 1 tablet by mouth daily.       penicillin VK (PEN VK) 500 MG tablet   0     sertraline (ZOLOFT) 50 MG tablet Take 50 mg by mouth daily.         11     No current facility-administered medications for this visit.      No Known Allergies     Family and Social History   Family History   Problem Relation Age of Onset     Heart failure Mother      Coronary artery disease Father      No Medical Problems Sister      No Medical Problems Brother      No Medical Problems Sister      No Medical Problems Brother      No Medical Problems Son      No Medical Problems Son      No Medical Problems Son      No Medical Problems Daughter         Social History     Tobacco Use     Smoking status: Former Smoker     Packs/day: 1.00     Years: 30.00     Pack years: 30.00     Last attempt to quit: 2011     Years since quittin.1     Smokeless tobacco: Never Used   Substance Use Topics     Alcohol use: Yes     Alcohol/week: 1.8 oz     Types: 3 Standard drinks or equivalent per week     Drug use: No        Physical Exam   General Appearance:   No acute distress    /64 (Patient Site: Left Arm, Patient Position: Sitting, Cuff Size: Adult Regular)   Pulse 71   Ht 6' 1\" (1.854 m)   Wt 195 lb (88.5 kg)   SpO2 97%   BMI 25.73 kg/m      EYES: Eyelids, conjunctiva, and sclera were normal. Pupils were normal. Cornea, iris, and lens were normal " bilaterally.  HEAD, EARS, NOSE, MOUTH, AND THROAT: Head and face were normal. Hearing was normal to voice and the ears were normal to external exam. Nose appearance was normal and there was no discharge. Oropharynx was normal.  NECK: Neck appearance was normal. There were no neck masses and the thyroid was not enlarged.  RESPIRATORY: Breathing pattern was normal and the chest moved symmetrically.  Percussion/auscultatory percussion was normal.  Lung sounds were normal and there were no abnormal sounds.  CARDIOVASCULAR: Heart rate and rhythm were normal.  S1 and S2 were normal and there were no extra sounds or murmurs. Peripheral pulses in arms and legs were normal.  Jugular venous pressure was normal.  There was no peripheral edema.  GASTROINTESTINAL: The abdomen was normal in contour.  Bowel sounds were present.  Percussion detected no organ enlargement or tenderness.  Palpation detected no tenderness, mass, or enlarged organs.   MUSCULOSKELETAL: Skeletal configuration was normal and muscle mass was normal for age. Joint appearance was overall normal.  LYMPHATIC: There were no enlarged nodes.  SKIN/HAIR/NAILS: Skin color was normal.  There were no skin lesions.  Hair and nails were normal.  NEUROLOGIC: The patient was alert and oriented to person, place, time, and circumstance. Speech was normal. Cranial nerves were normal. Motor strength was normal for age. The patient was normally coordinated.  PSYCHIATRIC:  Mood and affect were normal and the patient had normal recent and remote memory. The patient's judgment and insight were normal.       Additional Information        Cam Hill MD  Internal Medicine  Contact me at [phone]

## 2021-05-27 NOTE — PROGRESS NOTES
NEUROSURGERY FOLLOW UP EVALUATION:    ASSESSMENT/ PLAN:  Olegario Grady is a 58 y.o. male,  status post removal of left synovial cyst at L3-4 with decompressive laminectomy, foraminotomy on 2/26/2019 by Dr. Banuelos.  Preoperatively presented with bilateral thigh pain and numbness. Left leg pain and numbness went down into the foot affecting his left big toe.         PLAN:  1.  May increase weight lifting by 5 pounds weekly.  2.  Physical therapy for low back and core strengthening.  3.  Follow up in 4 weeks to discuss return to work.       Today he reports complete resolution of the symptoms. He has occasional thigh tingling when he walks but it resolves. His sensation has returned.     EXAM:    Alert and oriented x3, speech clear  Arm strength: 5/5  Leg strength: 5/5   Bulk and tone: normal  Gait normal  Incision well healed         Rosy Mj, NP  [facility]

## 2021-05-27 NOTE — PATIENT INSTRUCTIONS - HE
1.  May increase weight lifting by 5 pounds weekly.  2.  Physical therapy for low back and core strengthening.  3.  Follow up in 4 weeks to discuss return to work.

## 2021-05-28 NOTE — PROGRESS NOTES
[facility]   [facility]-Pelvic Initial Evaluation    Patient Name: Olegario Grady  Date of evaluation: 4/22/2019  Referral Diagnosis: Spinal stenosis of lumbar region with neurogenic claudication  Referring provider: Rosy Barker CNP  Visit Diagnosis:     ICD-10-CM    1. Spinal stenosis of lumbar region with neurogenic claudication M48.062    2. Generalized muscle weakness M62.81        Assessment:      Pt. is appropriate for skilled PT intervention as outlined in the Plan of Care (POC).  Pt. is a good candidate for skilled PT services to improve pain levels and function.  Patient presents S/P removal of left synovial cyst at L3-4 with decompressive laminectomy, foraminotomy on 2/26/2019 by Dr. Banuelos.  He is now ready to strengthen.  He demonstrates spine and hip stiffness, core weakness.  He has tight hamstrings and is tight throughout the hips.  Functional limitations are described as occurring with: not working, lifting heavier objects, standing, bending.  Feel patient will benefit from stretches, education, and strengthening to return to his prior level of function.      Goals:  Pt. will be independent with home exercise program in : 6 weeks    Pt will: Able to return to work within 8 weeks  Pt will: Able to lift 50# for work with good mechanics within 8 weeks  Pt will: Able to stand for 15+ minutes to be able to prep a meal or return to work within 8 weeks      Patient's expectations/goals are realistic.    Barriers to Learning or Achieving Goals:  Patient Active Problem List   Diagnosis     Multiple sclerosis (H) - Cabello     Chronic fatigue     Spinal stenosis of lumbar region with neurogenic claudication       POC, goals and pathology of condition were reviewed with the patient.  Patient is in agreement with the POC.       Plan / Patient Instructions:        Plan of Care:   Authorization / Certification Start Date: 04/22/19  Authorization / Certification End Date: 06/22/19  Authorization /  Certification Number of Visits: 10  Communication with: Referral Source  Patient Related Instruction: Nature of Condition;Treatment plan and rationale;Self Care instruction;Basis of treatment;Posture;Body mechanics  Times per Week: 1-2  Number of Weeks: 8  Number of Visits: 10  Therapeutic Exercise: Stretching;Strengthening;ROM  Neuromuscular Reeducation: kinesio tape;posture;core      Plan for next visit:   review stretches  Lifting as regular exercise  Strengthening for HEP    edu for posture and body mechanics  Will return to MD on May 14, 2019       Subjective:           History of Present Illness:    Olegario is a 58 y.o. male who presents to therapy today with complaints of low back pain.  He is status post removal of left synovial cyst at L3-4 with decompressive laminectomy, foraminotomy on 2019 by Dr. Banuelos.  Since surgery he had bilateral thigh pain and numbness and numbness into the left great toe but has since resolved.  He is now ready to strengthen post surgically.   Symptoms are constant and getting better. He did have a fusion on L4-5 .  Prior to surgery he did go to the gym regularly and since the surgery goes 5X/week mostly for walking.  He is up to 1 mile on the treadmill.    Pain Ratin  Pain rating at best: 1  Pain rating at worst: 7  Pain description: numbness and tingling    Functional limitations are described as occurring with:   not working, lifting heavier objects, standing, bending    Patient reports not having to do much for pain control since surgery.         Objective:      Note: Items left blank indicates the item was not performed or not indicated at the time of the evaluation.    Patient Outcome Measures :    Modified Oswestry Low Back Pain Disablity Questionnaire  in %: 20     Scores range from 0-100%, where a score of 0% represents minimal pain and maximal function. The minimal clinically important difference is a score reduction of 12%.    Examination  1. Spinal  stenosis of lumbar region with neurogenic claudication     2. Generalized muscle weakness       Involved side: Left  Posture Observation:      General standing posture is slight trunk flexion.  Pt feels this is much improved since surgery.    Lumbar ROM:         Within Normal Limits unless noted  Date: 04/22/19     *Indicate scale AROM AROM AROM   Lumbar Flexion Hands to ankles-stiffness     Lumbar Extension Neutral, Stiffness,       Right Left Right Left Right Left   Lumbar Sidebending stiff stiff       Lumbar Rotation stiff stiff       Thoracic Flexion      Thoracic Extension      Thoracic Sidebending         Thoracic Rotation           Lower Extremity Strength:     Date: 04/22/19     LE strength/5 Right Left Right Left Right Left   Hip Flexion (L1-3) 5 5       Hip Extension (L5-S1) 4+ 4+       Hip Abduction (L4-5) 5 5       Hip Adduction (L2-3) 4 4       Hip External Rotation         Hip Internal Rotation         Knee Extension (L3-4)         Knee Flexion 5 5       Ankle Dorsiflexion (L4-5) 5 5       Great Toe Extension (L5)         Ankle Plantar flexion (S1)         Abdominals        Sensation           Reflex Testing  Lumbar Dermatomes Right Left UE Reflexes Right Left   Iliac Crest and Groin (L1)   Biceps (C5-6)     Anterior Medial Thigh (L2)   Brachioradialis (C5-6)     Anterior Thigh, Medial Epicondyle Femur (L3)   Triceps (C7-8)     Lateral Thigh, Anterior Knee, Medial Leg/Malleolus (L4)   Mallory s test     Lateral Leg, Dorsal Foot (L5)   LE Reflexes     Lateral Foot (S1)   Patellar (L3-4)     Posterior Leg (S2)   Achilles (S1-2)     Other:   Babinski Response       Palpation: left QL, sitfness over bilateral lumbar paraspinals  1 Leg Stance: 15 seconds bilaterally  Trendelenberg: negative  Squat: wide stance  Able to toe and heel walk  Bridging: weakness demonstrated with qualtiy    Lumbar Special Tests:     Lumbar Special Tests Right Left SI Tests Right  Left   Quadrant test   SI Compression      Straight leg raise - - SI Distraction     Crossover response   POSH Test     Slump   Sacral Thrust     Sit-up test  FADIR     Trunk extensor endurance test  Resisted Abduction     Prone instability test  Other:     Pubic shotgun  Other:         LE Screen:  mild to moderate tight hamstrings, right >left decrease in hip IR     Exercises:  Exercise #1: stretches: sitting hamstring, standing gastroc, SKC, LTR  Comment #1:   hold 30 seconds X 1-3 reps  Exercise #2: LE supine nerve glide   repeat 12-15 reps  x 3-5 times  Comment #2: clamshells  X 20      Treatment Today     TREATMENT MINUTES COMMENTS   Evaluation 25 lumbar   Self-care/ Home management     Manual therapy     Neuromuscular Re-education     Therapeutic Activity     Therapeutic Exercises 30 See flow sheet or above   Gait training     Modality__________________                Total 55    Blank areas are intentional and mean the treatment did not include these items.       PT Evaluation Code: (Please list factors)  Patient History/Comorbidities:   Patient Active Problem List   Diagnosis     Multiple sclerosis (H) - Cabello     Chronic fatigue     Spinal stenosis of lumbar region with neurogenic claudication     Examination: stiffness/pain to palpation, decrease strength,   Clinical Presentation: stable  Clinical Decision Making: low    Patient History/  Comorbidities Examination  (body structures and functions, activity limitations, and/or participation restrictions) Clinical Presentation Clinical Decision Making (Complexity)   No documented Comorbidities or personal factors 1-2 Elements Stable and/or uncomplicated Low   1-2 documented comorbidities or personal factor 3 Elements Evolving clinical presentation with changing characteristics Moderate   3-4 documented comorbidities or personal factors 4 or more Unstable and unpredictable High              Carol Samuels, PT  4/22/2019  8:08 AM

## 2021-05-28 NOTE — PROGRESS NOTES
[facility] Daily Progress     [facility] Discharge Summary  Patient Name: Olegario Grady  Date: 5/28/2019  Referral Diagnosis: Low Back Pain  Referring provider: Rosy Barker CNP  Visit Diagnosis:   1. Spinal stenosis of lumbar region with neurogenic claudication     2. Generalized muscle weakness         Goals:  Pt. will be independent with home exercise program in : 6 weeks    Pt will: Able to return to work within 8 weeks  Pt will: Able to lift 50# for work with good mechanics within 8 weeks  Pt will: Able to stand for 15+ minutes to be able to prep a meal or return to work within 8 weeks      Patient was seen for 7 visits from 4/22/19 to 5/13/19 with 0 missed appointments.  See note below.  Patient was cleared by the neurosurgeon to return to work.    Therapy will be discontinued at this time.  The patient will need a new referral to resume.    Thank you for your referral.  Carol Samuels  5/28/2019  10:19 AM    Patient Name: Olegario Grady  Date: 5/13/2019  Visit #: 7/10  4/22/19-6/22/19  Referral Diagnosis: Spinal stenosis of lumbar region with neurogenic claudication  Referring provider: Rosy Barker CNP  Visit Diagnosis:     ICD-10-CM    1. Spinal stenosis of lumbar region with neurogenic claudication M48.062    2. Generalized muscle weakness M62.81          Assessment:     Patient feels he is ready to return to work.  He has been increasing his lifting weight capacity regularly in the clinic and demonstrates good mechanics and does not experience pain.    Status:  ongoing  Pt. will be independent with home exercise program in : 6 weeks    Pt will: Able to return to work within 8 weeks  Pt will: Able to lift 50# for work with good mechanics within 8 weeks  Pt will: Able to stand for 15+ minutes to be able to prep a meal or return to work within 8 weeks    1) goal met pt performs his exercises regularly  2) not attempted- will meet with MD tomorrow to hopefully get ok to  "return to work  3) goal met able to safely lift 50# in clinic with good mechanics  4) goal met pt isn't sure he has stood for 15 minutes consistently but he feels he could if he had to    Plan / Patient Education:     Will return to MD on May 14, 2019  If pt does not schedule within 2 weeks his chart will be discharged.  Pt is in agreement with this plan.    Subjective:     Pain Ratin     See neurosurgeon tomorrow.  Hoping to get the clearance to return to work  I was able to fish this past weekend and did many steps and did not have any pain or problems with either    Objective:     Modified Oswestry Low Back Pain Disablity Questionnaire  in %: 2    Was 20 on initial evaluation    Exercises:  Exercise #1: stretches: sitting hamstring, standing gastroc, SKC, LTR  Comment #1:   hold 30 seconds X 1-3 reps  Exercise #2: LE supine nerve glide   repeat 12-15 reps  x 3-5 times  Comment #2: clamshells  X 20  green tband added   Exercise #3: bridges    Progressed to leg ext  holding 10 seconds x 3-6 reps each side  Comment #3: all 4's  alt arm and leg ext  hold 10 seconds X 3-6 reps each side  Exercise #4: low abs: hooklying, hip and knee flex  X 20  Comment #4: dead bug  X 20  Exercise #5: Nu-step  seat 14, workload 8  5'  Comment #5: T leg squat  L 25  20 reps  Exercise #6: side stepping with green band at ankles  30'X2 each direction  Comment #6: step up  8\" step  X 10      LIFTING:  Crate:  50#, 15\" stool, pivot, place on floor.  Then reverse and put back on stool.  X 10 (power position with pivot)              50#  15\" lift, carry  20', set on table.  Lift again, carry 20'  X 5 reps    12# weight, squat, ball on outside left knee, pivot and lift ball up over right shoulder and back,(diagonal pattern)  each side X 8 reps              Treatment Today     TREATMENT MINUTES COMMENTS   Evaluation     Self-care/ Home management     Manual therapy     Neuromuscular Re-education     Therapeutic Activity   "   Therapeutic Exercises 25 See above or flow sheet     Gait training     Modality__________________                Total 25    Blank areas are intentional and mean the treatment did not include these items.       Carol Samuels, PT  5/13/2019

## 2021-05-28 NOTE — PROGRESS NOTES
NEUROSURGERY FOLLOW UP EVALUATION:    ASSESSMENT/ PLAN:  Olegario Grady is a 58 y.o. male,  status post removal of left synovial cyst at L3-4 with decompressive laminectomy, foraminotomy on 2/26/2019 by Dr. Banuelos.  Preoperatively presented with bilateral thigh pain and numbness. Left leg pain and numbness went down into the foot affecting his left big toe. Pre-op symptoms resolved. He has been doing well.       PLAN:  1.  Continue PT, walking as tolerated  2.  Okay to return to work  3.  Follow up PRN      Today he reports complete resolution of the symptoms. He has occasional stiffness with prolonged activity or standing. Otherwise no complaints. Finished PT yesterday    EXAM:  /84   Pulse 68   Resp 18     Alert and oriented x3, speech clear  Arm strength: 5/5  Leg strength: 5/5   Bulk and tone: normal  Gait normal  Incision well healed     Dai Allen [facility]  O: [phone]  P: [phone]

## 2021-05-28 NOTE — PROGRESS NOTES
"[facility] Daily Progress     Patient Name: Olegario Grady  Date: 2019  Visit #: 2/10  4/22/19-19  Referral Diagnosis: Spinal stenosis of lumbar region with neurogenic claudication  Referring provider: Rosy Barker, KENDAL  Visit Diagnosis:     ICD-10-CM    1. Spinal stenosis of lumbar region with neurogenic claudication M48.062    2. Generalized muscle weakness M62.81          Assessment:     Patient is benefitting from skilled physical therapy and is making steady progress toward functional goals.  Patient is appropriate to continue with skilled physical therapy intervention, as indicated by initial plan of care.  Fatigue noted in low back with bridges and carrying.  Patient did demonstrate good technique with both  Goal Status:  Pt. will be independent with home exercise program in : 6 weeks    Pt will: Able to return to work within 8 weeks  Pt will: Able to lift 50# for work with good mechanics within 8 weeks  Pt will: Able to stand for 15+ minutes to be able to prep a meal or return to work within 8 weeks      Plan / Patient Education:   Lifting as regular exercise  Strengthening for HEP    edu for posture and body mechanics  Will return to MD on May 14, 2019        Subjective:     Pain Ratin    No specific change since last visit.      Objective:     Patient Active Problem List   Diagnosis     Multiple sclerosis (H) - [place]     Chronic fatigue     Spinal stenosis of lumbar region with neurogenic claudication     Exercises:  Exercise #1: stretches: sitting hamstring, standing gastroc, SKC, LTR  Comment #1:   hold 30 seconds X 1-3 reps  Exercise #2: LE supine nerve glide   repeat 12-15 reps  x 3-5 times  Comment #2: clamshells  X 20  green tband added   Exercise #3: bridges  hold 10 seconds X 6-12 reps  Comment #3: all 4's  alt arm and leg ext  hold 10 seconds X 3-6 reps each side      LIFTING:  Crate:  15#, 15\" stool, pivot, place on floor.  Then reverse and put back on stool.  X 10 " (power position with pivot)               15#, carry 150' X 2    Treatment Today     TREATMENT MINUTES COMMENTS   Evaluation     Self-care/ Home management     Manual therapy     Neuromuscular Re-education     Therapeutic Activity     Therapeutic Exercises 25 Green tband issued today  RACHAEL and Roxana engagement  See above or flow sheet   Gait training     Modality__________________                Total 25    Blank areas are intentional and mean the treatment did not include these items.       Carol Samuels, PT  4/24/2019

## 2021-05-28 NOTE — PROGRESS NOTES
[facility] Daily Progress     Patient Name: Olegario Grady  Date: 2019  Visit #: 5/10  4/22/19-19  Referral Diagnosis: Spinal stenosis of lumbar region with neurogenic claudication  Referring provider: Rosy Barker, KENDAL  Visit Diagnosis:     ICD-10-CM    1. Spinal stenosis of lumbar region with neurogenic claudication M48.062    2. Generalized muscle weakness M62.81          Assessment:     Patient is benefitting from skilled physical therapy and is making steady progress toward functional goals.  Patient is appropriate to continue with skilled physical therapy intervention, as indicated by initial plan of care.  Patient has a little soreness in his thoracic region.  Suggested heat or ice and try stretching.  Feel the soreness is from increasing activities back.  He is working to good fatigue levels with the lifts and carries.  Still demonstrates good technique with both.    Status:  ongoing  Pt. will be independent with home exercise program in : 6 weeks    Pt will: Able to return to work within 8 weeks  Pt will: Able to lift 50# for work with good mechanics within 8 weeks  Pt will: Able to stand for 15+ minutes to be able to prep a meal or return to work within 8 weeks      Plan / Patient Education:   Lifting as regular exercise  Strengthening for HEP    Will return to MD on May 14, 2019    Subjective:     Pain Ratin     I still go to the gym trying to gain strength  Still does the HEP as well  I cut my grass the other day and it went ok.  Tried to make sure I used good mechanics        Objective:     Patient Active Problem List   Diagnosis     Multiple sclerosis (H) - [place]     Chronic fatigue     Spinal stenosis of lumbar region with neurogenic claudication     Exercises:  Exercise #1: stretches: sitting hamstring, standing gastroc, SKC, LTR  Comment #1:   hold 30 seconds X 1-3 reps  Exercise #2: LE supine nerve glide   repeat 12-15 reps  x 3-5 times  Comment #2: clamshells  X 20   "green tband added   Exercise #3: bridges    Progressed to leg ext  holding 10 seconds x 3-6 reps each side  Comment #3: all 4's  alt arm and leg ext  hold 10 seconds X 3-6 reps each side  Exercise #4: low abs: hooklying, hip and knee flex  X 20  Comment #4: dead bug  X 20  Exercise #5: Nu-step  seat 14, workload 8  5'  Comment #5: T leg squat  L 21  20 reps      LIFTING:  Crate:  40#, 15\" stool, pivot, place on floor.  Then reverse and put back on stool.  X 10 (power position with pivot)              35#  15\" lift, carry  20', set on table.  Lift again, carry 20'  X 5 reps    10# weight, squat, ball on outside left knee, pivot and lift ball up over right shoulder and back, each side X 8 reps                Treatment Today     TREATMENT MINUTES COMMENTS   Evaluation     Self-care/ Home management     Manual therapy     Neuromuscular Re-education     Therapeutic Activity     Therapeutic Exercises 25 See above or flow sheet     Gait training     Modality__________________                Total 25    Blank areas are intentional and mean the treatment did not include these items.       Carol Samuels, PT  2019    "

## 2021-05-28 NOTE — PROGRESS NOTES
[facility] Daily Progress     Patient Name: Olegario Grady  Date: 2019  Visit #: 4/10  4/22/19-19  Referral Diagnosis: Spinal stenosis of lumbar region with neurogenic claudication  Referring provider: Rosy Barker, KENDAL  Visit Diagnosis:     ICD-10-CM    1. Spinal stenosis of lumbar region with neurogenic claudication M48.062    2. Generalized muscle weakness M62.81          Assessment:     Patient is benefitting from skilled physical therapy and is making steady progress toward functional goals.  Patient is appropriate to continue with skilled physical therapy intervention, as indicated by initial plan of care.  Patient had many questions on lifting mechanics.  Will work on proper mechanics at home.    Status:  ongoing  Pt. will be independent with home exercise program in : 6 weeks    Pt will: Able to return to work within 8 weeks  Pt will: Able to lift 50# for work with good mechanics within 8 weeks  Pt will: Able to stand for 15+ minutes to be able to prep a meal or return to work within 8 weeks      Plan / Patient Education:   Lifting as regular exercise  Strengthening for HEP    Will return to MD on May 14, 2019    Subjective:     Pain Ratin- 1    No problems since I was here last  Exercises are going ok.  I try to do them at or before I go to the gym.  I am able to do more and more at the gym and do not have any problems      Objective:     Patient Active Problem List   Diagnosis     Multiple sclerosis (H) - [place]     Chronic fatigue     Spinal stenosis of lumbar region with neurogenic claudication     Exercises:  Exercise #1: stretches: sitting hamstring, standing gastroc, SKC, LTR  Comment #1:   hold 30 seconds X 1-3 reps  Exercise #2: LE supine nerve glide   repeat 12-15 reps  x 3-5 times  Comment #2: clamshells  X 20  green tband added   Exercise #3: bridges    Progressed to leg ext  holding 10 seconds x 3-6 reps each side  Comment #3: all 4's  alt arm and leg ext  hold 10  "seconds X 3-6 reps each side  Exercise #4: low abs: hooklying, hip and knee flex  X 20  Comment #4: dead bug  X 20      LIFTING:  Crate:  30#, 15\" stool, pivot, place on floor.  Then reverse and put back on stool.  X 7 (power position with pivot)              30#  15\" lift, carry  20', set on table.  Lift again, carry 20'  X 5 reps    8# ball, squat, ball on outside left knee, pivot and lift ball up over right shoulder and back, each side X 8 reps      Body mechanics:  Pivot, push, pull, overhead, power position, vacuum, sweep, golfer's lift, full squat           Treatment Today     TREATMENT MINUTES COMMENTS   Evaluation     Self-care/ Home management     Manual therapy     Neuromuscular Re-education     Therapeutic Activity     Therapeutic Exercises 28 See above or flow sheet  Brief edu on posture and body mechanics-handout issued   Gait training     Modality__________________                Total 28    Blank areas are intentional and mean the treatment did not include these items.       Carol Samuels, PT  5/1/2019    "

## 2021-05-28 NOTE — PROGRESS NOTES
[facility] Daily Progress     Patient Name: Olegario Grady  Date: 2019  Visit #: 3/10  4/22/19-19  Referral Diagnosis: Spinal stenosis of lumbar region with neurogenic claudication  Referring provider: Rosy Barker, KENDAL  Visit Diagnosis:     ICD-10-CM    1. Spinal stenosis of lumbar region with neurogenic claudication M48.062    2. Generalized muscle weakness M62.81          Assessment:     Patient is benefitting from skilled physical therapy and is making steady progress toward functional goals.  Patient is appropriate to continue with skilled physical therapy intervention, as indicated by initial plan of care.  Able to progress some of the HEP as his core continues to get stronger.  Did feel weakness with the progressions.    Goal Status:  Pt. will be independent with home exercise program in : 6 weeks    Pt will: Able to return to work within 8 weeks  Pt will: Able to lift 50# for work with good mechanics within 8 weeks  Pt will: Able to stand for 15+ minutes to be able to prep a meal or return to work within 8 weeks      Plan / Patient Education:   Lifting as regular exercise  Strengthening for HEP    edu for posture and body mechanics  Will return to MD on May 14, 2019        Subjective:     Pain Ratin- 1    No specific change since last visit.  No soreness.      Objective:     Patient Active Problem List   Diagnosis     Multiple sclerosis (H) - [place]     Chronic fatigue     Spinal stenosis of lumbar region with neurogenic claudication     Exercises:  Exercise #1: stretches: sitting hamstring, standing gastroc, SKC, LTR  Comment #1:   hold 30 seconds X 1-3 reps  Exercise #2: LE supine nerve glide   repeat 12-15 reps  x 3-5 times  Comment #2: clamshells  X 20  green tband added   Exercise #3: bridges    Progressed to leg ext  holding 10 seconds x 3-6 reps each side  Comment #3: all 4's  alt arm and leg ext  hold 10 seconds X 3-6 reps each side  Exercise #4: low abs: hooklying, hip  "and knee flex  X 20  Comment #4: dead bug  X 20      LIFTING:  Crate:  20#, 15\" stool, pivot, place on floor.  Then reverse and put back on stool.  X 7 (power position with pivot)              25#  15\" lift, carry  20', set on table.  Lift again, carry 20'  X 5 reps    8# ball, squat, ball on outside left knee, pivot and lift ball up over right shoulder and back, each side X 8 reps             Treatment Today     TREATMENT MINUTES COMMENTS   Evaluation     Self-care/ Home management     Manual therapy     Neuromuscular Re-education     Therapeutic Activity     Therapeutic Exercises 25 See above or flow sheet   Gait training     Modality__________________                Total 25    Blank areas are intentional and mean the treatment did not include these items.       Carol Samuels, PT  4/29/2019    "

## 2021-05-28 NOTE — PATIENT INSTRUCTIONS - HE
Continue to progress activity, PT if you're doing it. Use good body mechanics. No smoking, get enough vitamin D. Return to clinic as needed.

## 2021-05-28 NOTE — PROGRESS NOTES
[facility] Daily Progress     Patient Name: Olegario Grady  Date: 2019  Visit #: 6/10  4/22/19-19  Referral Diagnosis: Spinal stenosis of lumbar region with neurogenic claudication  Referring provider: Rosy Barker CNP  Visit Diagnosis:     ICD-10-CM    1. Spinal stenosis of lumbar region with neurogenic claudication M48.062    2. Generalized muscle weakness M62.81          Assessment:     Patient is benefitting from skilled physical therapy and is making steady progress toward functional goals.  Patient is appropriate to continue with skilled physical therapy intervention, as indicated by initial plan of care.  As weights have increased, he continues to demonstrate good technique with all.  Feel he is doing a good job conditioning his leg and core muscles to work up to the appropriate weights and return to work.  He works to a good fatigue level with all.  Continue to focus on leg and core strengthening     Status:  ongoing  Pt. will be independent with home exercise program in : 6 weeks    Pt will: Able to return to work within 8 weeks  Pt will: Able to lift 50# for work with good mechanics within 8 weeks  Pt will: Able to stand for 15+ minutes to be able to prep a meal or return to work within 8 weeks      Plan / Patient Education:   Lifting as regular exercise  Strengthening for HEP    Will return to MD on May 14, 2019    Subjective:     Pain Ratin     Not much different since last visit  No increase in pain or soreness      Objective:     Patient Active Problem List   Diagnosis     Multiple sclerosis (H) - [place]     Chronic fatigue     Spinal stenosis of lumbar region with neurogenic claudication     Exercises:  Exercise #1: stretches: sitting hamstring, standing gastroc, SKC, LTR  Comment #1:   hold 30 seconds X 1-3 reps  Exercise #2: LE supine nerve glide   repeat 12-15 reps  x 3-5 times  Comment #2: clamshells  X 20  green tband added   Exercise #3: bridges    Progressed to leg  "ext  holding 10 seconds x 3-6 reps each side  Comment #3: all 4's  alt arm and leg ext  hold 10 seconds X 3-6 reps each side  Exercise #4: low abs: hooklying, hip and knee flex  X 20  Comment #4: dead bug  X 20  Exercise #5: Nu-step  seat 14, workload 8  5'  Comment #5: T leg squat  L 25  20 reps  Exercise #6: side stepping with green band at ankles  30'X2 each direction  Comment #6: step up  8\" step  X 10      LIFTING:  Crate:  45#, 15\" stool, pivot, place on floor.  Then reverse and put back on stool.  X 10 (power position with pivot)              45#  15\" lift, carry  20', set on table.  Lift again, carry 20'  X 5 reps    10# weight, squat, ball on outside left knee, pivot and lift ball up over right shoulder and back, each side X 8 reps                Treatment Today     TREATMENT MINUTES COMMENTS   Evaluation     Self-care/ Home management     Manual therapy     Neuromuscular Re-education     Therapeutic Activity     Therapeutic Exercises 25 See above or flow sheet     Gait training     Modality__________________                Total 25    Blank areas are intentional and mean the treatment did not include these items.       Carol Samuels, PT  2019    "

## 2021-06-01 ENCOUNTER — RECORDS - HEALTHEAST (OUTPATIENT)
Dept: ADMINISTRATIVE | Facility: CLINIC | Age: 61
End: 2021-06-01

## 2021-06-02 ENCOUNTER — RECORDS - HEALTHEAST (OUTPATIENT)
Dept: ADMINISTRATIVE | Facility: CLINIC | Age: 61
End: 2021-06-02

## 2021-06-02 VITALS — BODY MASS INDEX: 24.52 KG/M2 | HEIGHT: 73 IN | WEIGHT: 185 LBS

## 2021-06-02 VITALS — HEIGHT: 73 IN | WEIGHT: 187 LBS | BODY MASS INDEX: 24.78 KG/M2

## 2021-06-02 VITALS — HEIGHT: 73 IN | BODY MASS INDEX: 25.84 KG/M2 | WEIGHT: 195 LBS

## 2021-06-02 VITALS — WEIGHT: 190 LBS | HEIGHT: 73 IN | BODY MASS INDEX: 25.18 KG/M2

## 2021-06-03 ENCOUNTER — RECORDS - HEALTHEAST (OUTPATIENT)
Dept: ADMINISTRATIVE | Facility: CLINIC | Age: 61
End: 2021-06-03

## 2021-06-07 NOTE — TELEPHONE ENCOUNTER
RN cannot approve Refill Request    RN can NOT refill this medication PCP messaged that patient is overdue for Office Visit. Last office visit: Visit date not found Last Physical: 4/5/2019 Last MTM visit: Visit date not found Last visit same specialty: Visit date not found.  Next visit within 3 mo: Visit date not found  Next physical within 3 mo: Visit date not found      Fanny Alexandre, [facility] Triage/Med Refill 4/6/2020    Requested Prescriptions   Pending Prescriptions Disp Refills     atorvastatin (LIPITOR) 20 MG tablet [Pharmacy Med Name: Atorvastatin Calcium Oral Tablet 20 MG] 90 tablet 2     Sig: Take 1 tablet (20 mg total) by mouth daily.       Statins Refill Protocol (Hmg CoA Reductase Inhibitors) Failed - 4/4/2020  7:00 AM        Failed - PCP or prescribing provider visit in past 12 months      Last office visit with prescriber/PCP: Visit date not found OR same dept: Visit date not found OR same specialty: Visit date not found  Last physical: 4/5/2019 Last MTM visit: Visit date not found   Next visit within 3 mo: Visit date not found  Next physical within 3 mo: Visit date not found  Prescriber OR PCP: Cam Hill MD  Last diagnosis associated with med order: 1. Mixed hyperlipidemia  - atorvastatin (LIPITOR) 20 MG tablet [Pharmacy Med Name: Atorvastatin Calcium Oral Tablet 20 MG]; Take 1 tablet (20 mg total) by mouth daily.  Dispense: 90 tablet; Refill: 2    If protocol passes may refill for 12 months if within 3 months of last provider visit (or a total of 15 months).

## 2021-06-14 NOTE — TELEPHONE ENCOUNTER
RN cannot approve Refill Request    RN can NOT refill this medication PCP messaged that patient is overdue for Office Visit. Last office visit: Visit date not found Last Physical: 4/5/2019 Last MTM visit: Visit date not found Last visit same specialty: Visit date not found.  Next visit within 3 mo: Visit date not found  Next physical within 3 mo: Visit date not found      Dominique Linn, [facility] Triage/Med Refill 1/24/2021    Requested Prescriptions   Pending Prescriptions Disp Refills     atorvastatin (LIPITOR) 20 MG tablet [Pharmacy Med Name: Atorvastatin Calcium Oral Tablet 20 MG] 90 tablet 0     Sig: Take 1 tablet (20 mg total) by mouth daily.       Statins Refill Protocol (Hmg CoA Reductase Inhibitors) Failed - 1/24/2021  2:00 AM        Failed - PCP or prescribing provider visit in past 12 months      Last office visit with prescriber/PCP: Visit date not found OR same dept: Visit date not found OR same specialty: Visit date not found  Last physical: 4/5/2019 Last MTM visit: Visit date not found   Next visit within 3 mo: Visit date not found  Next physical within 3 mo: Visit date not found  Prescriber OR PCP: Cam Hill MD  Last diagnosis associated with med order: 1. Mixed hyperlipidemia  - atorvastatin (LIPITOR) 20 MG tablet [Pharmacy Med Name: Atorvastatin Calcium Oral Tablet 20 MG]; Take 1 tablet (20 mg total) by mouth daily.  Dispense: 90 tablet; Refill: 0    If protocol passes may refill for 12 months if within 3 months of last provider visit (or a total of 15 months).

## 2021-06-14 NOTE — TELEPHONE ENCOUNTER
RN cannot approve Refill Request    RN can NOT refill this medication Protocol failed and NO refill given. Last office visit: Visit date not found Last Physical: 4/5/2019 Last MTM visit: Visit date not found Last visit same specialty: Visit date not found.  Next visit within 3 mo: Visit date not found  Next physical within 3 mo: Visit date not found      Tressa Rahman, [facility] Connection Triage/Med Refill 2/2/2021    Requested Prescriptions   Pending Prescriptions Disp Refills     atorvastatin (LIPITOR) 20 MG tablet [Pharmacy Med Name: Atorvastatin Calcium Oral Tablet 20 MG] 90 tablet 0     Sig: Take 1 tablet (20 mg total) by mouth daily.       Statins Refill Protocol (Hmg CoA Reductase Inhibitors) Failed - 2/2/2021  7:33 AM        Failed - PCP or prescribing provider visit in past 12 months      Last office visit with prescriber/PCP: Visit date not found OR same dept: Visit date not found OR same specialty: Visit date not found  Last physical: 4/5/2019 Last MTM visit: Visit date not found   Next visit within 3 mo: Visit date not found  Next physical within 3 mo: Visit date not found  Prescriber OR PCP: Cam Hill MD  Last diagnosis associated with med order: 1. Mixed hyperlipidemia  - atorvastatin (LIPITOR) 20 MG tablet [Pharmacy Med Name: Atorvastatin Calcium Oral Tablet 20 MG]; Take 1 tablet (20 mg total) by mouth daily.  Dispense: 90 tablet; Refill: 0    If protocol passes may refill for 12 months if within 3 months of last provider visit (or a total of 15 months).

## 2021-06-18 NOTE — LETTER
Letter by Rachael Banuelos MD at      Author: Rachael Banuelos MD Service: -- Author Type: --    Filed:  Encounter Date: 2/21/2019 Status: (Other)       Dear Mr. Grady,    This letter will help in preparation for your upcoming surgery. Please contact us with any additional questions you may have regarding your surgery. Contact information for your surgery scheduler:   Manny Salinas, and Benoit: [phone] ~ Vin Sood and Ruth: [phone] ~ Adán    You are scheduled for: Lumbar Decompressive Laminectomy  With: Rachael Banuelos M.D.  Date/Time: Tuesday, February 26, 2019 at 12:10 p.m. (time subject to change)  Location: [place] [address]    Check in at the Welcome Desk inside the main doors of the [facility]. An escort will take you to the surgery waiting area. A nurse from [facility] (surgery admit unit) at the [facility] will call you with your exact arrival time to the hospital - typically one-and-a-half to two-and-a-half hours prior to your scheduled surgery time.     In the event of an emergency surgery case, there may be an adjustment to your start time for surgery.     PREPARING FOR YOUR SURGERY    *Pre-op Physical: 02/21/2019 at 2:40 p.m., with Dr. Hill at [facility].      *Please discuss the necessity of receiving a pneumococcal vaccine prior to surgery at your pre-op physical. Recommended for all patients over the age of 65, or based on certain medical conditions.     *After the pre-op physical is complete, please have your clinic fax the visit note to your surgery scheduler at [phone].    *Pre-op Lab Work: Determined at your pre-op physical    *Readiness Visit: Dr. Banuelos's nurse will call you prior to the day of surgery to go over the results of your pre-op physical, along with any additional information you will need for the day of surgery.     *Ensure that you have  completed your pre-op physical, along with any other necessary tests/appointments (listed above), prior to your Readiness Visit.               ADDITIONAL INFORMATION REGARDING YOUR SURGERY    Medications    Bring a list ALL of your medications, including any over-the-counter vitamins and herbal supplements to your Readiness Visit, and on the day of surgery.    DO NOT bring your medications with you the day of surgery.    Please see attached third sheet for more details on medications/vitamins/herbal supplements that should be discontinued prior to your surgery date.     If you are unsure if you should discontinue a medication/ vitamin/herbal supplement, please call our office and discuss with a nurse.    Continue taking your medications/vitamins/herbal supplements unless they are on the attached list.     Failure to follow the instructions regarding medications/vitamins/herbal supplements will result in cancellation of your surgery.    Day BEFORE Surgery    DO NOT shave near your surgical site. This can cause irritation of the skin    Using a washcloth and provided bottle of Hibiclens, shower the night BEFORE surgery, using a half bottle of Hibiclens to wash your body, avoiding face and genitals. The morning OF surgery, shower and use the second half of the bottle to wash your body, avoiding face and genitals. If you are unable to take a shower the morning of surgery, please discuss your options with the nurse at your readiness visit.     NOTHING  to eat after 11:00 p.m. the night prior to your procedure    CLEAR LIQUIDS: May have the following liquids up to two (2) hours before your arrival time at the hospital: water, plain black coffee (no cream or milk), plain black tea or plain green tea (no cream or milk), Gatorade or Propel Water.    SMOKING: Stop smoking as far before surgery as possible, or as directed by your surgeon. NO tobacco products of any kind (cigarettes, e-cigarettes, chewing tobacco) beginning at  11:00 p.m. the night prior to your procedure.     ALCOHOL: You should stop drinking alcohol beginning at 11:00 p.m. the night prior to your procedure    Contact our office if you have symptoms of illness such as a fever of 101 or greater, chills, cough, sore throat, or if you develop a rash or any open sore    Day OF Surgery    If youve been instructed to take a medication(s) on the morning of surgery, please take with a very small sip of water.    Wear loose & comfortable clothing and flat shoes, Leave jewelry/valuables at home. If you wear contact lenses, remove them at home and wear glasses. Remove any body piercings. Remove nail polish.     Planning for Discharge    Start planning for your care after discharge as soon as you receive this letter.    If you have not made arrangements to have someone take you home and stay with you for the first 48 hours after discharge, your surgery will be cancelled.        PRE-OPERATIVE MEDICATION INSTRUCTIONS  Review this information with your primary care physician prior to discontinuing any of the medications listed below.  Notify your primary care physician that you have been instructed to discontinue these medications.    TEN (10) Days Prior to Surgery, STOP the Following Medications   [place]  Anacin  Aspirin  Excedrin  Pepto Bismol    **Before taking ANY over-the-counter medications, check the label for Aspirin   Non-steroidal   Anti-inflammatory Medications (NSAIDS)    Celebrex  Diclofenac (Cataflam)  Etodolac (Iodine)  Fenoprofen (Nalfon)  Ibuprofen (Advil, Motrin, Nuprin)  Indomethacin (Indocin)  Ketoprofen  Ketorolac (Toradol)  Melaxicam (Mobic)  Naproxen (AnaProx, Aleve, Naprosyn)  Relafen (Nabumetone)   Herbal Supplements (this is a partial list of herbals to be discontinued)    Alessia Perez Qurahel  Ephedra  Feverfew  Fish Oil  Flaxseed Oil  Garlic  Daniela  Gingko  Ginseng  Goldenseal  Imitrex (Sumatriptan)  Kava  Krill Oil  Licorice  Multi Vitamins  Ranshaw  Wort  Valerian  Vitamin E  Tutuhiemma   CHECK WITH YOUR PRESCRIBING DOCTOR BEFORE STOPPING ANY BLOOD THINNERS (approximately 7 days prior to surgery)  (Coumadin, Plavix, Platel, Aggrenox, Effient (Prasugrel), Ticlid), Xarelto, and Pradaxa      ALWAYS CHECK WITH YOUR PRESCRIBING DOCTOR REGARDING THE MEDICATIONS LISTED BELOW; RECOMMENDED STOP TIME IS ALSO LISTED      If you are taking Lovenox, discontinue 24 HOURS prior to surgery    If you are taking weight loss medication, discontinue 7 days prior to surgery    If you are taking Metformin or Simvastatin, check with your primary care physician (or whoever has prescribed you this medication) regarding when to discontinue prior to surgery        and traditional parking is located at [facility] at [address] [place] [address]. Validation is done at the Welcome Desk [facility] the [facility].

## 2021-06-18 NOTE — LETTER
Letter by Cam Hill MD at      Author: Cam Hill MD Service: -- Author Type: --    Filed:  Encounter Date: 2/22/2019 Status: (Other)       Olegario Grady  [address] [facility] [address]             February 22, 2019         Dear Mr. Grady,    Below are the results from your recent visit:    Resulted Orders   Basic Metabolic Panel   Result Value Ref Range    Sodium 141 136 - 145 mmol/L    Potassium 3.9 3.5 - 5.0 mmol/L    Chloride 105 98 - 107 mmol/L    CO2 26 22 - 31 mmol/L    Anion Gap, Calculation 10 5 - 18 mmol/L    Glucose 92 70 - 125 mg/dL    Calcium 9.5 8.5 - 10.5 mg/dL    BUN 19 8 - 22 mg/dL    Creatinine 0.87 0.70 - 1.30 mg/dL    GFR MDRD Af Amer >60 >60 mL/min/1.73m2    GFR MDRD Non Af Amer >60 >60 mL/min/1.73m2    Narrative    Fasting Glucose reference range is 70-99 mg/dL per  American Diabetes Association (ADA) guidelines.   HM2(CBC w/o Differential)   Result Value Ref Range    WBC 7.7 4.0 - 11.0 thou/uL    RBC 4.92 4.40 - 6.20 mill/uL    Hemoglobin 15.4 14.0 - 18.0 g/dL    Hematocrit 44.2 40.0 - 54.0 %    MCV 90 80 - 100 fL    MCH 31.2 27.0 - 34.0 pg    MCHC 34.8 32.0 - 36.0 g/dL    RDW 12.2 11.0 - 14.5 %    Platelets 232 140 - 440 thou/uL    MPV 7.4 7.0 - 10.0 fL   INR   Result Value Ref Range    INR 0.98 0.90 - 1.10    Narrative    INR Therapeutic Ranges:  Mech. Valve 2.5-3.5  Post Surg.  2.0-3.0  DVT/PE      2.0-3.0       Labs all look okay, excellent    Please call with questions or contact us using [employer].    Sincerely,        Electronically signed by Cam Hill MD

## 2021-06-19 NOTE — LETTER
Letter by Cam Hill MD at      Author: Cam Hill MD Service: -- Author Type: --    Filed:  Encounter Date: 4/9/2019 Status: (Other)         Olegario Grady  [address] [facility] [address]             April 9, 2019         Dear Mr. Grady,    Below are the results from your recent visit:    Resulted Orders   Glycosylated Hemoglobin A1c   Result Value Ref Range    Hemoglobin A1c 5.1 3.5 - 6.0 %   Lipid Cascade   Result Value Ref Range    Cholesterol 216 (H) <=199 mg/dL    Triglycerides 220 (H) <=149 mg/dL    HDL Cholesterol 47 >=40 mg/dL    LDL Calculated 125 <=129 mg/dL    Patient Fasting > 8hrs? Unknown    PSA (Prostatic-Specific Antigen), Annual Screen   Result Value Ref Range    PSA 0.6 0.0 - 3.5 ng/mL    Narrative    Method is Abbott Prostate-Specific Antigen (PSA)  Standard-WHO 1st International (90:10)   Urinalysis-UC if Indicated   Result Value Ref Range    Color, UA Yellow Colorless, Yellow, Straw, Light Yellow    Clarity, UA Clear Clear    Glucose, UA Negative Negative    Bilirubin, UA Negative Negative    Ketones, UA Negative Negative    Specific Gravity, UA 1.020 1.005 - 1.030    Blood, UA Negative Negative    pH, UA 7.0 5.0 - 8.0    Protein, UA Negative Negative mg/dL    Urobilinogen, UA 0.2 E.U./dL 0.2 E.U./dL, 1.0 E.U./dL    Nitrite, UA Negative Negative    Leukocytes, UA Negative Negative    Narrative    Microscopic not indicated  UC not indicated   Thyroid Cascade   Result Value Ref Range    TSH 1.21 0.30 - 5.00 uIU/mL   Hepatitis C Antibody (Anti-HCV)   Result Value Ref Range    Hepatitis C Ab Negative Negative       Labs generally look okay, excellent.  Your triglycerides, part of cholesterol panel are a bit elevated.  You could consider taking a medication such as Lipitor/atorvastatin to lower these and reduce your risk of heart attack.    Please call with questions or contact us using [employer].    Sincerely,        Electronically signed by Cam Hill,  MD

## 2021-06-19 NOTE — LETTER
Letter by Dai Allen CNP at      Author: Dai Allen CNP Service: -- Author Type: --    Filed:  Encounter Date: 5/14/2019 Status: (Other)         May 14, 2019     Patient: Olegario Grady   YOB: 1960   Date of Visit: 5/14/2019       To Whom It May Concern:    It is my medical opinion that Olegario Grady may return to full duty on 5/20/2019 with no restrictions.    If you have any questions or concerns, please don't hesitate to call.    Sincerely,        Electronically signed by Dai Allen CNP

## 2021-06-19 NOTE — LETTER
Letter by Rosy Braker CNP at      Author: Rosy Barker CNP Service: -- Author Type: --    Filed:  Encounter Date: 4/16/2019 Status: (Other)         April 16, 2019     Patient: Olegario Grady   YOB: 1960   Date of Visit: 4/16/2019       To Whom It May Concern:    It is my medical opinion that Olegario Grady should remain out of work until seen in our clinic in 4 weeks and completion of physical therapy..    If you have any questions or concerns, please don't hesitate to call.    Sincerely,        Electronically signed by Rosy Barker CNP

## 2021-06-20 NOTE — LETTER
Letter by Charla Mayer RN at      Author: Charla Mayer RN Service: -- Author Type: --    Filed:  Encounter Date: 7/15/2020 Status: (Other)         Olegario Grady  [address] [facility] [address]        July 15, 2020     RE: Reminder about lung cancer screening     Dear Olgeario,    Just a reminder, you are due for your yearly exam to check for lung cancer.   Please call your primary care doctor to schedule a brief visit so your Low Dose CT scan can be ordered at the time of that appointment. Your scan needs to be done within 30 days of your office visit.      You should have a yearly exam if:  Youre age 55 to 80 (55 to 77, if youre on Medicare)  Youve smoked a pack a day for at least 30 years (or 2 packs a day for at least 15 years)  If you no longer smoke, its been less than 15 years since you quit     These exams work best when done every year. They are good at finding lung cancer early, but they cant find all lung cancers. If you develop any symptoms (shortness of breath, chest pain, coughing up blood), call your doctor right away.     If you would like help to quit smoking, please talk with your doctor during your next visit. Or, call [employer] at [phone].        Sincerely,  [facility] ([facility]) Lung Cancer Screening Program

## 2021-06-20 NOTE — LETTER
Letter by Charla Mayer RN at      Author: Charla Mayer RN Service: -- Author Type: --    Filed:  Encounter Date: 8/20/2020 Status: (Other)         Olegario Grady  [address] [facility] [address]      08/20/20      Dear Olegario,     Your annual lung cancer screening scan is overdue. We have attempted to reach you without success. If you remain interested please call your primary care provider to schedule an office visit so they can do the screening.  We will make no further attempts to call you to schedule at this point if we do not hear from you and assume you are no longer interested in the screening.     Sincerely,  [facility] ([facility]) Lung [facility] Screening Program

## 2021-06-24 ENCOUNTER — RECORDS - HEALTHEAST (OUTPATIENT)
Dept: ADMINISTRATIVE | Facility: OTHER | Age: 61
End: 2021-06-24

## 2021-06-24 ENCOUNTER — OFFICE VISIT (OUTPATIENT)
Dept: NEUROLOGY | Facility: CLINIC | Age: 61
End: 2021-06-24
Payer: COMMERCIAL

## 2021-06-24 VITALS
HEART RATE: 73 BPM | SYSTOLIC BLOOD PRESSURE: 104 MMHG | DIASTOLIC BLOOD PRESSURE: 72 MMHG | WEIGHT: 190 LBS | BODY MASS INDEX: 24.38 KG/M2 | HEIGHT: 74 IN

## 2021-06-24 DIAGNOSIS — G35 RELAPSING REMITTING MULTIPLE SCLEROSIS (H): Primary | ICD-10-CM

## 2021-06-24 DIAGNOSIS — G35 MULTIPLE SCLEROSIS (H): ICD-10-CM

## 2021-06-24 PROCEDURE — 99214 OFFICE O/P EST MOD 30 MIN: CPT | Performed by: PSYCHIATRY & NEUROLOGY

## 2021-06-24 RX ORDER — MODAFINIL 200 MG/1
200 TABLET ORAL 2 TIMES DAILY
Qty: 60 TABLET | Refills: 5 | Status: SHIPPED | OUTPATIENT
Start: 2021-06-24 | End: 2022-02-16

## 2021-06-24 RX ORDER — GLATIRAMER 40 MG/ML
40 INJECTION, SOLUTION SUBCUTANEOUS
Qty: 36 ML | Refills: 3 | Status: SHIPPED | OUTPATIENT
Start: 2021-06-25 | End: 2022-05-27

## 2021-06-24 ASSESSMENT — MIFFLIN-ST. JEOR: SCORE: 1738.64

## 2021-06-24 NOTE — PATIENT INSTRUCTIONS - HE
Lumbar decompression at L3-4      Provided complete information about approaching surgery.  Discussed discharge planning and expected outcomes after surgery as well as follow-ups and restrictions.  Emphasized on stop taking ASA, NSAID's, vitamins and /or OTC herbal supplements within 10 days and any anticoagulant meds within 7 days prior to surgery.  Reminded patient to bring all pertinent films to hospital the day of surgery.    NPO after midnight    Provided written pamphlet about surgery.  Answered all questions.  The  will call patient with all pre-op orders and instructions.  Patient to complete all required diagnostic tests prior to surgery.  If test are not completed this will cancel the surgery; contact the clinic nurses at [phone] if unable to complete test.          Using a washcloth and a bottle of provided Hibiclens, wash your body, avoiding your face and genitals. Preferably, shower the night before surgery and the morning of surgery using a half a bottle each time for your whole body shower. If you are unable to take a shower in the morning of surgery, please discuss your options with the nurse at your readiness visit.

## 2021-06-24 NOTE — PATIENT INSTRUCTIONS - HE
A dressing is not required.    Keep the wound clean.    Wash your hands before touching the wound.  Ensure that anyone assisting you in the care of your wound washes her/his hands before touching the wound. Good handwashing can decrease the risk of serious infection.    If you are unable to see your wound, have someone check the wound daily for redness, swelling,or drainage. A small amount of drainage is normal.    You may shower.  Pat the wound dry. Do not rub.    No tub baths until the wound is well healed.  Usually 5-6 weeks.     If you develop redness, swelling, drainage, or temp 101 or greater, call our office at [phone].        * No lifting, pushing or pulling greater than 5-10 pound (this is about a gallon of milk).  *No repetitive bending, twisting, or jarring activities  *No overhead work  *No aerobic or strenuous activity  *No activities with increased risk of falls  *You may move about your home as tolerated  *You may walk up and down stairs as tolerated  *You may increase your activity slowly over the next 4-6 weeks    WALKING PROGRAM: As you can tolerate, walk daily-start with 5-10 minutes of continuous walking. This is in addition to the walking that you do as part of your daily activities. Increase the time that you walk by 5 minutes every couple of days. Do not exceed 30-45 minutes of continuous walking until seen in follow-up. Walking is the best exercise after surgery.  **Listen to your body, if you find that you are more painful or fatigued, you may need to proceed more slowly.    **Do not smoke or expose yourself to second hand smoke. Cigarette smoke can delay healing and cause complications.     DRIVING:  We recommend that you do not drive while taking medications for pain or muscle spasms. Always read and follow the advice on your prescription bottle. If you have questions, speak with your pharmacist.  We recommend that you do not drive while wearing a brace, as it could limit your range  of motion.    WORK: If you plan to return to work before you 4-6 weeks appointment, call and discuss with one of the nurses in the neurosurgery office.

## 2021-06-24 NOTE — LETTER
2021         RE: Olegario Grady  [address] [facility] [address] [facility] [address]        Dear Colleague,    Thank you for referring your patient, Olegario Grady, to the [facility] NEUROLOGY CLINIC [place]. Please see a copy of my visit note below.    NEUROLOGY FOLLOW UP VISIT  NOTE       [facility] NEUROLOGY [place]  [address] [place] [address]  Tel: [phone]  Fax: [phone]  [email or web address]     Olegario Grady,  1960, MRN [id number]  PCP: Cam Hill  Date: 2021      ASSESSMENT & PLAN     Diagnosis code  1. Relapsing remitting multiple sclerosis (H)     Relapsing remitting multiple sclerosis  60-year-old male with history of depression who is been followed in our clinic for relapsing remitting multiple sclerosis.  His condition has remained fairly stable on Copaxone.  He does struggle with fatigue and depression and also takes Provigil and Zoloft.  I have recommended:    1.  Continue Copaxone 40 mg subcu 4 times a week.  I refilled his prescription  2.  Continue Provigil 200 mg daily and Zoloft 50 mg daily.  I refilled his prescriptions  3.  Check MRI brain, cervical and thoracic spine with and without contrast  4.  Follow-up will be in 1 year    Thank you again for this referral, please feel free to contact me if you have any questions.    Ross Cabello MD  [facility]  (Former[facility], [facility]     HISTORY OF PRESENT ILLNESS     Patient is a 60-year-old male with history of relapsing remitting multiple sclerosis, depression who returns for yearly follow-up.  He denies any flareup since his last visit and is here to get his medication refilled.  MS is also resulted in fatigue and depression for which she takes Provigil and Zoloft.  It has been more than 3 years since he had an imaging study and last year he was reluctant to get the imaging study done due to the pandemic.   Previously he had developed some lower back pain and had MRI that showed severe L3-L4 stenosis and had decompressive surgery done.  Since his last visit he reports no significant change in his symptoms.  Occasionally he has some balance issues but overall does not feel there is any change.     PROBLEM LIST   Patient Active Problem List   Diagnosis Code     Relapsing remitting multiple sclerosis (H) G35     Depression F32.9     Spinal stenosis of lumbar region with neurogenic claudication M48.062     Tobacco user Z72.0         PAST MEDICAL & SURGICAL HISTORY     Past Medical History:   Patient  has no past medical history on file.    Surgical History:  He  has a past surgical history that includes Laminectomy lumbar one level; Fusion lumbar anterior one level; and IR Lumbar Epidural Steroid Injection (9/13/2011).     SOCIAL HISTORY     Reviewed, and he  reports that he has quit smoking. He has never used smokeless tobacco. He reports current alcohol use.     FAMILY HISTORY     Reviewed, and family history includes Coronary Artery Disease in his father; Hyperlipidemia in his father; Hypertension in his father.     ALLERGIES     No Known Allergies      REVIEW OF SYSTEMS     A 12 point review of system was performed and was negative except as outlined in the history of present illness.     HOME MEDICATIONS     Current Outpatient Rx   Medication Sig Dispense Refill     atorvastatin (LIPITOR) 20 MG tablet Take 20 mg by mouth       Bioflavonoid Products (VITAMIN C) CHEW        [START ON 6/25/2021] glatiramer acetate 40 MG/ML injection Inject 40 mg Subcutaneous three times a week 36 mL 3     Glatiramer Acetate 40 MG/ML SOSY Inject 40 mg Subcutaneous three times a week       modafinil (PROVIGIL) 200 MG tablet Take 1 tablet (200 mg) by mouth 2 times daily 60 tablet 5     MULTIPLE VITAMIN PO Take 1 tablet by mouth       sertraline (ZOLOFT) 50 MG tablet Take 1 tablet (50 mg) by mouth daily 90 tablet 3     Vitamin D3  "(CHOLECALCIFEROL) 125 MCG (5000 UT) tablet Take 5,000 Units by mouth           PHYSICAL EXAM     Vital signs  /72 (BP Location: Left arm, Patient Position: Sitting)   Pulse 73   Ht 1.867 m (6' 1.5\")   Wt 86.2 kg (190 lb)   BMI 24.73 kg/m      Weight:   190 lbs 0 oz    Patient is alert and oriented x4 in no acute distress. Vital signs were reviewed and are documented in electronic medical record. Neck was supple, no carotid bruits, thyromegaly, JVD, or lymphadenopathy was noted.   NEUROLOGY EXAM:    Patient s speech was normal with no aphasia or dysarthria. Mentation, and affect were also normal.     Funduscopic exam was normal, with normal cup to disc ratio. Cranial nerves II -XII were intact.     Patient had normal mass, tone and motor strength was 5/5 in all extremities without pronator drift.     Sensation was intact to light touch, pinprick, and vibratory sensation.     Reflexes were 2+ symmetrical with downgoing toes.     No dysmetria noted on FNF or HKS. Romberg was negative.    Gait testing was normal. Able to walk on toes/heels. Tandem walk normal.     DIAGNOSTIC STUDIES     PERTINENT RADIOLOGY  Following imaging studies were reviewed:     LUMBAR  SPINE 2/1/19  1. Canal decompression and posterior instrumented fusion L4-L5. Canal is   well decompressed dorsally without canal stenosis. Neural foramina are   partially obscured without findings for high-grade foraminal stenosis.    2. Although the neural foramina are significantly obscured by   susceptibility artifact at L5-S1, there appears to be severe right and   moderate left neural foraminal stenosis at this level.     3. Severe spinal canal stenosis L3-L4 with left greater than right   lateral recess stenosis. Severe left and more moderate right neural   foraminal stenosis.     4. At L2-L3, there is moderate spinal canal stenosis without significant   foraminal stenosis.    MRI BRAIN: 8/30/18  1. A few small nonspecific white matter " hyperintensities are new versus   the prior exam but do not enhance compatible with areas of now chronic   demyelination that have occurred in the interval. No pathologic   enhancement to indicate active demyelination   in the brain.  2. No superimposed acute intracranial finding.    MRI CERVICAL SPINE 8/30/2018  1. Small focus of nonenhancing T2 hyperintensity in the right spinal cord   at the C5-C6 level compatible with focus of chronic demyelination is   unchanged from prior. No new spinal cord signal abnormality. No pathologic   enhancement.    MRI THORACIC SPINE 8/30/2018  1. Multiple patchy areas of nonenhancing T2 signal hyperintensity within   the thoracic spinal cord are not definitely changed versus prior.  2. No pathologic enhancement to indicate active demyelination in the   thoracic spinal cord.     PERTINENT LABS  Following labs were reviewed:  No visits with results within 3 Month(s) from this visit.   Latest known visit with results is:   Office Visit - [facility] on 04/05/2019   Component Date Value     Color Urine 04/05/2019 Yellow      Appearance Urine 04/05/2019 Clear      Glucose Urine 04/05/2019 Negative      Bilirubin Urine 04/05/2019 Negative      Ketones Urine 04/05/2019 Negative      Specific Gravity Urine 04/05/2019 1.020      Blood Urine 04/05/2019 Negative      pH Urine 04/05/2019 7.0      Protein Albumin Urine 04/05/2019 Negative      Urobilinogen Urine 04/05/2019 0.2 E.U./dL      Nitrite Urine 04/05/2019 Negative      Leukocyte Esterase Urine 04/05/2019 Negative      TSH 04/05/2019 1.21          Total time spent for face to face visit, reviewing labs/imaging studies, counseling and coordination of care was: 30 Minutes spent on the date of the encounter doing chart review, review of outside records, review of test results, interpretation of tests, patient visit and documentation       This note was dictated using voice recognition software.  Any grammatical or context distortions are  unintentional and inherent to the software.    Orders Placed This Encounter   Procedures     MR Brain w/o & w Contrast     MR Cervical Spine w/o & w Contrast     MR Thoracic Spine w/o & w Contrast      New Prescriptions    No medications on file     Modified Medications    Modified Medication Previous Medication    GLATIRAMER ACETATE 40 MG/ML INJECTION glatiramer acetate 40 MG/ML injection       Inject 40 mg Subcutaneous three times a week    Inject 40 mg Subcutaneous three times a week    MODAFINIL (PROVIGIL) 200 MG TABLET modafinil (PROVIGIL) 200 MG tablet       Take 1 tablet (200 mg) by mouth 2 times daily    Take 1 tablet (200 mg) by mouth 2 times daily    SERTRALINE (ZOLOFT) 50 MG TABLET sertraline (ZOLOFT) 50 MG tablet       Take 1 tablet (50 mg) by mouth daily    Take 1 tablet (50 mg) by mouth daily                     Again, thank you for allowing me to participate in the care of your patient.        Sincerely,        Ross Cabello MD

## 2021-06-24 NOTE — TELEPHONE ENCOUNTER
PATIENT NAME:  Olegario Grady  YOB: 1960  MRN: [id number]  SURGEON:   DATE of SURGERY: 2-26-19  PROCEDURE: LUMBAR 3-LUMBAR 4 DECOMPRESSIVE LAMINECTOMY BILATERAL FORAMENOTOMY, REMOVAL OF SYNOVIAL CYST ON THE LEFT    FOLLOW-UP:    [facility] Out : 14 Days [On nurse schedule unless noted otherwise]  Post Op Visit: 6+ weeks   Post-op Provider: np  DIAGNOSTICS:  none  DISPOSITION:  Home after surgery    ADDITIONAL INSTRUCTIONS FOR MEDICAL STAFF:

## 2021-06-24 NOTE — TELEPHONE ENCOUNTER
New Appointment Needed  What is the reason for the visit:    Pre-Op Appt Request  When is the surgery? :  02/26/19  Where is the surgery?:   [facility]  Who is the surgeon? :  Dr. Banuelos  What type of surgery is being done?: Back surgery  Provider Preference: Dr. Hill  How soon do you need to be seen?: As soon as possible  Waitlist offered?: No  Okay to leave a detailed message:  Yes. Patient has not been seen at a [facility] clinic. Please call Ye[facility] to set up appointment.

## 2021-06-24 NOTE — PROGRESS NOTES
NEUROLOGY FOLLOW UP VISIT  NOTE       [facility] NEUROLOGY [place]  [address] [place] [address]  Tel: [phone]  Fax: [phone]  [email or web address]     Olegario Grady,  1960, MRN [id number]  PCP: Cam Hill  Date: 2021      ASSESSMENT & PLAN     Diagnosis code  1. Relapsing remitting multiple sclerosis (H)     Relapsing remitting multiple sclerosis  60-year-old male with history of depression who is been followed in our clinic for relapsing remitting multiple sclerosis.  His condition has remained fairly stable on Copaxone.  He does struggle with fatigue and depression and also takes Provigil and Zoloft.  I have recommended:    1.  Continue Copaxone 40 mg subcu 4 times a week.  I refilled his prescription  2.  Continue Provigil 200 mg daily and Zoloft 50 mg daily.  I refilled his prescriptions  3.  Check MRI brain, cervical and thoracic spine with and without contrast  4.  Follow-up will be in 1 year    Thank you again for this referral, please feel free to contact me if you have any questions.    Ross Cabello MD  [facility]  ([facility], [facility]     HISTORY OF PRESENT ILLNESS     Patient is a 60-year-old male with history of relapsing remitting multiple sclerosis, depression who returns for yearly follow-up.  He denies any flareup since his last visit and is here to get his medication refilled.  MS is also resulted in fatigue and depression for which she takes Provigil and Zoloft.  It has been more than 3 years since he had an imaging study and last year he was reluctant to get the imaging study done due to the pandemic.  Previously he had developed some lower back pain and had MRI that showed severe L3-L4 stenosis and had decompressive surgery done.  Since his last visit he reports no significant change in his symptoms.  Occasionally he has some balance issues but overall does not feel there is  "any change.     PROBLEM LIST   Patient Active Problem List   Diagnosis Code     Relapsing remitting multiple sclerosis (H) G35     Depression F32.9     Spinal stenosis of lumbar region with neurogenic claudication M48.062     Tobacco user Z72.0         PAST MEDICAL & SURGICAL HISTORY     Past Medical History:   Patient  has no past medical history on file.    Surgical History:  He  has a past surgical history that includes Laminectomy lumbar one level; Fusion lumbar anterior one level; and IR Lumbar Epidural Steroid Injection (9/13/2011).     SOCIAL HISTORY     Reviewed, and he  reports that he has quit smoking. He has never used smokeless tobacco. He reports current alcohol use.     FAMILY HISTORY     Reviewed, and family history includes Coronary Artery Disease in his father; Hyperlipidemia in his father; Hypertension in his father.     ALLERGIES     No Known Allergies      REVIEW OF SYSTEMS     A 12 point review of system was performed and was negative except as outlined in the history of present illness.     HOME MEDICATIONS     Current Outpatient Rx   Medication Sig Dispense Refill     atorvastatin (LIPITOR) 20 MG tablet Take 20 mg by mouth       Bioflavonoid Products (VITAMIN C) CHEW        [START ON 6/25/2021] glatiramer acetate 40 MG/ML injection Inject 40 mg Subcutaneous three times a week 36 mL 3     Glatiramer Acetate 40 MG/ML SOSY Inject 40 mg Subcutaneous three times a week       modafinil (PROVIGIL) 200 MG tablet Take 1 tablet (200 mg) by mouth 2 times daily 60 tablet 5     MULTIPLE VITAMIN PO Take 1 tablet by mouth       sertraline (ZOLOFT) 50 MG tablet Take 1 tablet (50 mg) by mouth daily 90 tablet 3     Vitamin D3 (CHOLECALCIFEROL) 125 MCG (5000 UT) tablet Take 5,000 Units by mouth           PHYSICAL EXAM     Vital signs  /72 (BP Location: Left arm, Patient Position: Sitting)   Pulse 73   Ht 1.867 m (6' 1.5\")   Wt 86.2 kg (190 lb)   BMI 24.73 kg/m      Weight:   190 lbs 0 oz    Patient is " alert and oriented x4 in no acute distress. Vital signs were reviewed and are documented in electronic medical record. Neck was supple, no carotid bruits, thyromegaly, JVD, or lymphadenopathy was noted.   NEUROLOGY EXAM:    Patient s speech was normal with no aphasia or dysarthria. Mentation, and affect were also normal.     Funduscopic exam was normal, with normal cup to disc ratio. Cranial nerves II -XII were intact.     Patient had normal mass, tone and motor strength was 5/5 in all extremities without pronator drift.     Sensation was intact to light touch, pinprick, and vibratory sensation.     Reflexes were 2+ symmetrical with downgoing toes.     No dysmetria noted on FNF or HKS. Romberg was negative.    Gait testing was normal. Able to walk on toes/heels. Tandem walk normal.     DIAGNOSTIC STUDIES     PERTINENT RADIOLOGY  Following imaging studies were reviewed:     LUMBAR  SPINE 2/1/19  1. Canal decompression and posterior instrumented fusion L4-L5. Canal is   well decompressed dorsally without canal stenosis. Neural foramina are   partially obscured without findings for high-grade foraminal stenosis.    2. Although the neural foramina are significantly obscured by   susceptibility artifact at L5-S1, there appears to be severe right and   moderate left neural foraminal stenosis at this level.     3. Severe spinal canal stenosis L3-L4 with left greater than right   lateral recess stenosis. Severe left and more moderate right neural   foraminal stenosis.     4. At L2-L3, there is moderate spinal canal stenosis without significant   foraminal stenosis.    MRI BRAIN: 8/30/18  1. A few small nonspecific white matter hyperintensities are new versus   the prior exam but do not enhance compatible with areas of now chronic   demyelination that have occurred in the interval. No pathologic   enhancement to indicate active demyelination   in the brain.  2. No superimposed acute intracranial finding.    MRI CERVICAL  SPINE 8/30/2018  1. Small focus of nonenhancing T2 hyperintensity in the right spinal cord   at the C5-C6 level compatible with focus of chronic demyelination is   unchanged from prior. No new spinal cord signal abnormality. No pathologic   enhancement.    MRI THORACIC SPINE 8/30/2018  1. Multiple patchy areas of nonenhancing T2 signal hyperintensity within   the thoracic spinal cord are not definitely changed versus prior.  2. No pathologic enhancement to indicate active demyelination in the   thoracic spinal cord.     PERTINENT LABS  Following labs were reviewed:  No visits with results within 3 Month(s) from this visit.   Latest known visit with results is:   Office Visit - [facility] on 04/05/2019   Component Date Value     Color Urine 04/05/2019 Yellow      Appearance Urine 04/05/2019 Clear      Glucose Urine 04/05/2019 Negative      Bilirubin Urine 04/05/2019 Negative      Ketones Urine 04/05/2019 Negative      Specific Gravity Urine 04/05/2019 1.020      Blood Urine 04/05/2019 Negative      pH Urine 04/05/2019 7.0      Protein Albumin Urine 04/05/2019 Negative      Urobilinogen Urine 04/05/2019 0.2 E.U./dL      Nitrite Urine 04/05/2019 Negative      Leukocyte Esterase Urine 04/05/2019 Negative      TSH 04/05/2019 1.21          Total time spent for face to face visit, reviewing labs/imaging studies, counseling and coordination of care was: 30 Minutes spent on the date of the encounter doing chart review, review of outside records, review of test results, interpretation of tests, patient visit and documentation       This note was dictated using voice recognition software.  Any grammatical or context distortions are unintentional and inherent to the software.    Orders Placed This Encounter   Procedures     MR Brain w/o & w Contrast     MR Cervical Spine w/o & w Contrast     MR Thoracic Spine w/o & w Contrast      New Prescriptions    No medications on file     Modified Medications    Modified Medication  Previous Medication    GLATIRAMER ACETATE 40 MG/ML INJECTION glatiramer acetate 40 MG/ML injection       Inject 40 mg Subcutaneous three times a week    Inject 40 mg Subcutaneous three times a week    MODAFINIL (PROVIGIL) 200 MG TABLET modafinil (PROVIGIL) 200 MG tablet       Take 1 tablet (200 mg) by mouth 2 times daily    Take 1 tablet (200 mg) by mouth 2 times daily    SERTRALINE (ZOLOFT) 50 MG TABLET sertraline (ZOLOFT) 50 MG tablet       Take 1 tablet (50 mg) by mouth daily    Take 1 tablet (50 mg) by mouth daily

## 2021-06-24 NOTE — PROGRESS NOTES
NEUROSURGERY CONSULTATION NOTE    2/12/2019     Olegario Grady is a 58 y.o. male who is sent to us in consultation by Ross Cabello     for evaluation of his lumbar spine.      The pt describes the symptoms as having  started several months of numbness in his thighs and left foot weakness.  Happens with standing and walking more than 100 feet .  Left foot weaker.      was previously  treated with decompression and fusion by Dr. Irizarry 2011 with good result.   Pt describes numbness upper buttocks bilaterally, and numbness down the left leg to the foot.    pain in the low back worse with standing and triggers with the last part of straightening up .   Sometimes he has frequency (attributes to his MS) sometimes urgency.  Sometimes can't start his stream.      Balance issues (weak in the legs) no problems with hands (other than buttoning his shirt sometimes) .  Has to lean on  A wall if he is washing his feet in the shower..        HPI:  MS on copaxone and injections three times a wk  No past medical history on file.  No past surgical history on file.      REVIEW OF SYSTEMS:  ROS reviewed with pt as documented on pt health form of 2/12/2019.    Negative cardiac, pulmonary (sometimes shortness of breath with walking maybe from pain) , hematological    .  No family hx of anesthetic reactions  .  No family hx of hypercoagulability .       MEDICATIONS:  No current outpatient medications on file.     No current facility-administered medications for this visit.          ALLERGIES/SENSITIVITIES:     Allergies not on file    PERTINENT SOCIAL HISTORY: quit smoker 5-6 yrs ago (for fusion)   Social History     Socioeconomic History     Marital status:      Spouse name: Not on file     Number of children: Not on file     Years of education: Not on file     Highest education level: Not on file   Social Needs     Financial resource strain: Not on file     Food insecurity - worry: Not on file     Food insecurity - inability:  Not on file     Transportation needs - medical: Not on file     Transportation needs - non-medical: Not on file   Occupational History     Not on file   Tobacco Use     Smoking status: Not on file   Substance and Sexual Activity     Alcohol use: Not on file     Drug use: Not on file     Sexual activity: Not on file   Other Topics Concern     Not on file   Social History Narrative     Not on file         FAMILY HISTORY:   No family history on file.     PHYSICAL EXAM:   Constitutional: There were no vitals taken for this visit.     Mental Status: A & O in no acute distress.  Affect is appropriate.  Speech is fluent.  Recent and remote memory are intact.  Attention span and concentration are normal.     Cranial Nerves: CN1: grossly intact per patient recall. CN2: No funduscopic exam performed. CN3,4 & 6: Pupillary light response, lateral and vertical gaze normal.  No nystagmus.  Visual fields are full to confrontation. CN5: Intact to touch CN7: No facial weakness, smile, facial symmetry intact. CN8: Intact to spoken voice. CN9&10: Gag reflex, uvula midline, palate rises with phonation. CN11: Shoulder shrug 5/5 intact bilaterally. CN12: Tongue midline and moves freely from side to side.     Motor: No pronator drift of upper extremity. Normal bulk and tone all muscle groups of upper and lower extremities (left foot weakness in EHL (no resistance) .       Sensory: Sensation intact bilaterally to light touch. No vibratory sensation in LE compared to UE      Coordination:  Heel/toe/  gait intact.   Unsteady tandem gait      Reflexes; supinator, biceps, triceps, more brisk on the right  Knee more brisk on left than right  ankle jerk present     hoffmans/ no  babinski/ clonus.    IMAGING: I personally reviewed all radiographic images    MRI  Severe stenosis L34 above level of fusion   -  STIR without end plate changes      Flex/ext  No instability       CONSULTATION ASSESSMENT AND PLAN:   Pt has signifcant stenosis at level  above fusion with neurogenic claudication and back pain not a big component.     We discussed options including decompression vs decompression with fusion extension and risks and benefits of both directions.  Given his good musculature, no back pain in office with changes of position and no dynamic movement radiographically  I think it would be reasonable to proceed with decompression alone and he agrees.  I think this should be done soon as there are some bladder changes (but no perineal numbness for which he knows to seek urgent care).     We discussed procedure risks/benefits expected course and we will will schedule.   Because of the bladder and the weakness in the foot all coming from a structural lesion there is no reason to do PT, injections etc.      I spent more than 45 minutes in this apt, examining the pt, reviewing the scans, reviewing notes from chart, discussing treatment options with risks and benefits and coordinating care. >50 % clinic time was spent in face to face counseling and coordinating care    Rachael Banuelos       Cc:   [facility]   Neurology

## 2021-06-24 NOTE — TELEPHONE ENCOUNTER
Called patient, discussed surgery, post-op course, expectations, follow up plan.    Reviewed H&P from 2/21/2019 - cleared for surgery  Labs, EKG - WNL    MRI done on  2/1/2019 - in [place]    To OR as planned.     Check in - 1000    Nothing to eat or drink after midnight the night before surgery.     Bring all pertinent films to hospital the day of surgery.     Continue to refrain from NSAIDS (Ibuprofen, Aleve, Naprosyn), ASA, Over the counter herbal medications or supplements, anti-coagulants and blood thinners.     Patient confirmed they have help/assistance in place at home upon discharge    Instructions: using a washcloth and a bottle of provided Hibiclens, wash your body, avoiding your face and genitals. Preferably, shower the night before surgery and the morning of surgery using a half a bottle each time for your whole body shower.    Answered all questions to patient's satisfaction.    Brooklyn Castellanos RN, CNRN

## 2021-06-24 NOTE — PROGRESS NOTES
Neurosurgery consultation was requested by: Dr. Cabello for evaluation of lumbar stenosis at L3-L4  Previous history of decompression L4-L5 followed by posterior fusion at L4-L5 in 2011  Pain: presents in bilateral low back over the past few months - was a gradual onset with no triggers  Radicular Pain is absent  Lhermitte sign: denies  Motor complaints: weakness in both legs L>R  Sensory complaints: numbness in bilateral inner thigh  Gait and balance issues: antalgic gait  Bowel or bladder issues: denies incontinence or saddle anesthesia  Duration of SX is: for 2 months  The symptoms are worse with: taking stairs and walking  The symptoms are better with: sitting and laying  Injury: denies  Severity is: moderate  Patient has tried the following conservative measures: NSAIDS  CARYN score is: 32%  Brooklyn Castellanos RN, CNRN

## 2021-06-24 NOTE — TELEPHONE ENCOUNTER
ORDER FROM: Dr. Banuelos    PRE AUTHORIZATION: No PA required; Ok to schedule    METHOD OF PATIENT CONTACT: Spoke with patient over the phone; Best number to reach him: [phone]    PROCEDURE: L3-L4 decompressive laminectomy bilateral foramenotomy    SURGICAL DATE: 02.26.19 @ 12:10 p.m.    READINESS VISIT: Please call    PCP, CLINIC, PHONE#: Dr. Hill; [facility]; [phone]; Pre-op physical: 02.21.19 @ 2:40 p.m.    FILM INFO: Lumbar MRI: 02.01.19 @ [facility]; XR Lumbar: 02.13.19 @ [facility]    SURGICAL LETTER: e-mailed 02.21.19

## 2021-06-24 NOTE — ANESTHESIA POSTPROCEDURE EVALUATION
Patient: Olegario Grady  LUMBAR 3-LUMBAR 4 DECOMPRESSIVE LAMINECTOMY BILATERAL FORAMENOTOMY, REMOVAL OF SYNOVIAL CYST ON THE LEFT  Anesthesia type: general    Patient location: PACU  Last vitals:   Vitals:    02/26/19 1830   BP: 126/68   Pulse: 72   Resp: 16   Temp:    SpO2: 97%     Post vital signs: stable  Level of consciousness: awake and responds to simple questions  Post-anesthesia pain: pain controlled  Post-anesthesia nausea and vomiting: no  Pulmonary: unassisted, return to baseline  Cardiovascular: stable and blood pressure at baseline  Hydration: adequate  Anesthetic events: no    QCDR Measures:  ASA# 11 - Michelle-op Cardiac Arrest: ASA11B - Patient did NOT experience unanticipated cardiac arrest  ASA# 12 - Michelle-op Mortality Rate: ASA12B - Patient did NOT die  ASA# 13 - PACU Re-Intubation Rate: ASA13B - Patient did NOT require a new airway mgmt  ASA# 10 - Composite Anes Safety: ASA10A - No serious adverse event    Additional Notes:

## 2021-06-24 NOTE — PROGRESS NOTES
Preoperative Consultation   Olegario Grady   58 y.o.  male    Date of visit: 2/21/2019  Physician: Cam Hill MD    This is a preoperative consultation requested by Dr. Rachael Banuelos in preparation for lumbar laminectomy on 2/26/2019 at [facility], fax [phone].       Assessment and Plan   Olegario Grady was seen in preoperative consultation in preparation for lumbar laminectomy.  This is a intermediate risk surgery and the patient has no increased risk for major cardiac complications.  He has stopped ibuprofen.  Labs pending.  No contraindication to proposed surgery.    1. Pre-operative examination    2. Spinal stenosis of lumbar region with neurogenic claudication    3. Multiple sclerosis (H) - Cabello    4. Chronic fatigue    5. Screen for colon cancer         Patient Profile   Social History     Social History Narrative    Lives with his wife, Anastasia.  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  Four adult children.        Past Medical History   Patient Active Problem List   Diagnosis     Multiple sclerosis (H) - Cabello     Chronic fatigue     Spinal stenosis of lumbar region with neurogenic claudication       Past Surgical History  He has a past surgical history that includes Posterior laminectomy / decompression lumbar spine; Lumbar fusion; Rotator cuff repair (Right); and ORIF femur fracture (Right).     History of Present Illness   This 58 y.o. old man comes in to establish care and for preoperative evaluation.  He has been having worsening discomfort and weakness in his proximal legs.  He has a history of spinal fusion L4-5 and history of MS with some lower extremity symptoms.  MS is in remission on Copaxone, follows with Dr. Ross Cabello.  Imaging of his lumbar spine shows severe stenosis above the level of fusion.  Surgery was recommended and the risk benefit of laminectomy versus fusion discussed with the patient.  He overall is quite healthy.  He exercises regularly without chest  "pain or shortness of breath.  No history of heart attack or stroke.  No history of blood clot or bleeding disorder.  Has tolerated anesthesia previously.  No symptoms suggestive of obstructive sleep apnea.  He does have some fatigue related to MS.    Recent antiplatelet use: yes Ibuprofen  Personal or family history of bleeding or clotting disorders: no  Steroid use in the past year: no  Personal or family history of difficulty with anesthesia: no  Current cardiopulmonary symptoms: no    Review of Systems: A comprehensive review of systems was negative except as noted.     Medications and Allergies   Current Outpatient Medications   Medication Sig Dispense Refill     COPAXONE 40 mg/mL Syrg injection   10     modafinil (PROVIGIL) 200 MG tablet   5     sertraline (ZOLOFT) 50 MG tablet   11     cholecalciferol, vitamin D3, (VITAMIN D3) 5,000 unit Tab Take 1 tablet (5,000 Units total) by mouth daily.  0     No current facility-administered medications for this visit.      No Known Allergies     Family and Social History   Family History   Problem Relation Age of Onset     Heart failure Mother      Coronary artery disease Father      No Medical Problems Sister      No Medical Problems Brother      No Medical Problems Sister      No Medical Problems Brother      No Medical Problems Son      No Medical Problems Son      No Medical Problems Son      No Medical Problems Daughter         Social History     Tobacco Use     Smoking status: Former Smoker     Smokeless tobacco: Never Used   Substance Use Topics     Alcohol use: Yes     Alcohol/week: 1.8 oz     Types: 3 Standard drinks or equivalent per week     Drug use: No        Physical Exam   General Appearance:   No acute distress    /72 (Patient Site: Right Arm, Patient Position: Sitting, Cuff Size: Adult Regular)   Pulse 81   Ht 6' 1\" (1.854 m)   Wt 187 lb (84.8 kg)   SpO2 97%   BMI 24.67 kg/m      EYES: Eyelids, conjunctiva, and sclera were normal. Pupils were " normal. Cornea, iris, and lens were normal bilaterally.  HEAD, EARS, NOSE, MOUTH, AND THROAT: Head and face were normal. Hearing was normal to voice and the ears were normal to external exam. Nose appearance was normal and there was no discharge. Oropharynx was normal.  NECK: Neck appearance was normal. There were no neck masses and the thyroid was not enlarged.  RESPIRATORY: Breathing pattern was normal and the chest moved symmetrically.  Percussion/auscultatory percussion was normal.  Lung sounds were normal and there were no abnormal sounds.  CARDIOVASCULAR: Heart rate and rhythm were normal.  S1 and S2 were normal and there were no extra sounds or murmurs. Peripheral pulses in arms and legs were normal.  Jugular venous pressure was normal.  There was no peripheral edema.  GASTROINTESTINAL: The abdomen was normal in contour.  Bowel sounds were present.  Percussion detected no organ enlargement or tenderness.  Palpation detected no tenderness, mass, or enlarged organs.   MUSCULOSKELETAL: Skeletal configuration was normal and muscle mass was normal for age. Joint appearance was overall normal.  LYMPHATIC: There were no enlarged nodes.  SKIN/HAIR/NAILS: Skin color was normal.  There were no skin lesions.  Hair and nails were normal.  NEUROLOGIC: The patient was alert and oriented to person, place, time, and circumstance. Speech was normal. Cranial nerves were normal. Motor strength was normal for age except for the left foot.. The patient was normally coordinated.  PSYCHIATRIC:  Mood and affect were normal and the patient had normal recent and remote memory. The patient's judgment and insight were normal.       Additional Tests   Laboratory: CBC looks okay.  Basic metabolic panel pending.  INR pending.    Radiology: I reviewed his imaging studies including MRI x-ray I also reviewed his neurology and neurosurgical consultations    Electrocardiogram: Normal sinus rhythm, personally reviewed    Total time spent with  the patient today was 60 minutes of which > 50% was spent in counseling and coordination of care     Cam Hill MD  Internal Medicine  Contact me at [phone]

## 2021-06-24 NOTE — NURSING NOTE
Chief Complaint   Patient presents with     Multiple Sclerosis     Annual follow up- doing well      Dorie Lopez CMA on 6/24/2021 at 10:27 AM

## 2021-06-24 NOTE — PROGRESS NOTES
Olegario Grady is status post removal of left synovial cyst at L3-4 with decompressive laminectomy, foramenotomy on 2/26/2019 by Dr. Banuelos.  Preoperatively presented with neurogenic claudication.  Today he returns in follow up for staples removal. He is very pleased with his outcome - his back discomfort is minimal, he denies radicular pain, sensory or motor disturbance in LE. Gait and balance are normal.      Surgical wound WNL - CDI, no signs of infection or skin breakdown.  Incision well-healed: good skin approximation, no redness or visible/palpable edema, no tenderness to palpation.  PT. AF, denies fever, chills or sweats.  Pt. reports that the symptoms are improved from pre-op.    Staples - intact removed without difficulty. Wound prepped with Betadine before and after removal.  Surrounding skin has no signs of breakdown.  Verbal instructions regarding incision care are given.  Pt. advised to call us if any s/s of infection noted - all discussed in details.      Brooklyn Castellanos, RN, CNRN

## 2021-06-24 NOTE — ANESTHESIA CARE TRANSFER NOTE
Last vitals:   Vitals:    02/26/19 1601   BP: 140/85   Pulse: 82   Resp: 18   Temp: 36.5  C (97.7  F)   SpO2: 100%     Patient's level of consciousness is drowsy  Spontaneous respirations: yes  Maintains airway independently: yes  Dentition unchanged: yes  Oropharynx: oropharynx clear of all foreign objects    QCDR Measures:  ASA# 20 - Surgical Safety Checklist: WHO surgical safety checklist completed prior to induction    PQRS# 430 - Adult PONV Prevention: 4558F - Pt received => 2 anti-emetic agents (different classes) preop & intraop  ASA# 8 - Peds PONV Prevention: NA - Not pediatric patient, not GA or 2 or more risk factors NOT present  PQRS# 424 - Michelle-op Temp Management: 4559F - At least one body temp DOCUMENTED => 35.5C or 95.9F within required timeframe  PQRS# 426 - PACU Transfer Protocol: - Transfer of care checklist used  ASA# 14 - Acute Post-op Pain: ASA14B - Patient did NOT experience pain >= 7 out of 10

## 2021-07-03 NOTE — ANESTHESIA PREPROCEDURE EVALUATION
Anesthesia Preprocedure Evaluation by Lane Simmons MD at 2/26/2019  9:49 AM     Author: Lane Simmons MD Service: -- Author Type: Physician    Filed: 2/26/2019 11:06 AM Date of Service: 2/26/2019  9:49 AM Status: Addendum    : Lane Simmons MD (Physician)    Related Notes: Original Note by Lane Simmons MD (Physician) filed at 2/26/2019  9:51 AM       Anesthesia Evaluation      Patient summary reviewed   No history of anesthetic complications     Airway   Mallampati: I  Neck ROM: full   Pulmonary - normal exam   (-) not a smoker (Former)                         Cardiovascular - negative ROS and normal exam  Exercise tolerance: > or = 4 METS   Neuro/Psych      Comments: Lumbar spinal stenosis    MS with h/o LE weakness, in remission    Endo/Other - negative ROS      GI/Hepatic/Renal - negative ROS           Dental    (+) caps and bridge    Comment: Crowns and bridges throughout                         Anesthesia Plan  Planned anesthetic: general endotracheal  Phenylephrine inline  Magnesium 1 gram/hour x 4 hours  Decadron 10 mg IV  Ketamine 50 mg IV      ASA 2   Induction: intravenous   Anesthetic plan and risks discussed with: patient  Anesthesia plan special considerations: antiemetics,   Post-op plan: routine recovery

## 2021-07-09 ENCOUNTER — HOSPITAL ENCOUNTER (OUTPATIENT)
Dept: MRI IMAGING | Facility: CLINIC | Age: 61
Discharge: HOME | End: 2021-07-09
Attending: PSYCHIATRY & NEUROLOGY
Payer: COMMERCIAL

## 2021-07-09 DIAGNOSIS — G35 RELAPSING REMITTING MULTIPLE SCLEROSIS (H): ICD-10-CM

## 2021-07-17 ENCOUNTER — TRANSFERRED RECORDS (OUTPATIENT)
Dept: HEALTH INFORMATION MANAGEMENT | Facility: CLINIC | Age: 61
End: 2021-07-17

## 2021-08-17 DIAGNOSIS — E78.2 MIXED HYPERLIPIDEMIA: Primary | ICD-10-CM

## 2021-08-20 RX ORDER — ATORVASTATIN CALCIUM 20 MG/1
TABLET, FILM COATED ORAL
Qty: 90 TABLET | Refills: 0 | Status: SHIPPED | OUTPATIENT
Start: 2021-08-20 | End: 2021-11-30

## 2021-08-20 NOTE — TELEPHONE ENCOUNTER
"  Disp Refills Start End MEGHAN    atorvastatin (LIPITOR) 20 MG tablet 90 tablet 0 2/2/2021  No   Sig - Route: Take 1 tablet (20 mg total) by mouth daily. - Oral   Sent to pharmacy as: atorvastatin 20 mg tablet (LIPITOR)   E-Prescribing Status: Receipt confirmed by pharmacy (2/2/2021  9:53 AM CST)   atorvastatin (LIPITOR) 20 MG tablet [[id number]]    Electronically signed by: Cam Hill MD on 02/02/21 0953 Status: Active   Ordering user: Cam Hill MD 02/02/21 0953 Authorized by: Cam Hill MD   Frequency: DAILY 02/02/21 - Until Discontinued Released by: Cam Hill MD 02/02/21 0953   Diagnoses  Mixed hyperlipidemia [E78.2]       Routing refill request to provider for review/approval because:  Labs not current:  LDL  Patient needs to be seen because it has been more than 1 year since last office visit.    Last Written Prescription Date:  2/2/21  Last Fill Quantity: 90,  # refills: 0   Last office visit provider:  4/5/19     Requested Prescriptions   Pending Prescriptions Disp Refills     atorvastatin (LIPITOR) 20 MG tablet [Pharmacy Med Name: Atorvastatin Calcium Oral Tablet 20 MG] 90 tablet 0     Sig: Take 1 tablet (20 mg total) by mouth daily.       Statins Protocol Failed - 8/17/2021  7:40 AM        Failed - LDL on file in past 12 months     Recent Labs   Lab Test 04/05/19  1119                Failed - Recent (12 mo) or future (30 days) visit within the authorizing provider's specialty     Patient has had an office visit with the authorizing provider or a provider within the authorizing providers department within the previous 12 mos or has a future within next 30 days. See \"Patient Info\" tab in inbasket, or \"Choose Columns\" in Meds & Orders section of the refill encounter.              Passed - No abnormal creatine kinase in past 12 months     No lab results found.             Passed - Medication is active on med list        Passed - Patient is age 18 or older       "       Olegario Gould RN 08/20/21 9:32 AM

## 2021-10-24 ENCOUNTER — HEALTH MAINTENANCE LETTER (OUTPATIENT)
Age: 61
End: 2021-10-24

## 2021-11-29 DIAGNOSIS — E78.2 MIXED HYPERLIPIDEMIA: ICD-10-CM

## 2021-11-30 RX ORDER — ATORVASTATIN CALCIUM 20 MG/1
TABLET, FILM COATED ORAL
Qty: 90 TABLET | Refills: 0 | Status: SHIPPED | OUTPATIENT
Start: 2021-11-30 | End: 2022-03-10

## 2021-11-30 NOTE — TELEPHONE ENCOUNTER
"Routing refill request to provider for review/approval because:  Labs not current:  LDL    Last Written Prescription Date:  8/20/2021  Last Fill Quantity: 90,  # refills: 0   Last office visit provider:  6/25/2021     Requested Prescriptions   Pending Prescriptions Disp Refills     atorvastatin (LIPITOR) 20 MG tablet [Pharmacy Med Name: Atorvastatin Calcium Oral Tablet 20 MG] 90 tablet 0     Sig: TAKE ONE TABLET BY MOUTH ONE TIME DAILY       Statins Protocol Failed - 11/29/2021  7:48 AM        Failed - LDL on file in past 12 months     Recent Labs   Lab Test 04/05/19  1119                Failed - Recent (12 mo) or future (30 days) visit within the authorizing provider's specialty     Patient has had an office visit with the authorizing provider or a provider within the authorizing providers department within the previous 12 mos or has a future within next 30 days. See \"Patient Info\" tab in inbasket, or \"Choose Columns\" in Meds & Orders section of the refill encounter.              Passed - No abnormal creatine kinase in past 12 months     No lab results found.             Passed - Medication is active on med list        Passed - Patient is age 18 or older             Irina Byrd RN 11/30/21 2:22 PM  "

## 2022-02-13 ENCOUNTER — HEALTH MAINTENANCE LETTER (OUTPATIENT)
Age: 62
End: 2022-02-13

## 2022-02-16 DIAGNOSIS — G35 RELAPSING REMITTING MULTIPLE SCLEROSIS (H): ICD-10-CM

## 2022-02-16 NOTE — TELEPHONE ENCOUNTER
Refill request for Modafinil. Pt last seen 6/24/21 and is not due for follow up until 6/2022.     Medication T'd for review and signature    Dai Chaves RN on 2/16/2022 at 3:21 PM

## 2022-02-17 RX ORDER — MODAFINIL 200 MG/1
200 TABLET ORAL 2 TIMES DAILY
Qty: 60 TABLET | Refills: 5 | Status: SHIPPED | OUTPATIENT
Start: 2022-02-17 | End: 2022-11-08

## 2022-03-10 DIAGNOSIS — E78.2 MIXED HYPERLIPIDEMIA: ICD-10-CM

## 2022-03-10 RX ORDER — ATORVASTATIN CALCIUM 20 MG/1
TABLET, FILM COATED ORAL
Qty: 90 TABLET | Refills: 0 | Status: SHIPPED | OUTPATIENT
Start: 2022-03-10 | End: 2022-06-27

## 2022-03-10 NOTE — TELEPHONE ENCOUNTER
"Routing refill request to provider for review/approval because:  Labs not current:  LDL  Patient needs to be seen because it has been more than 1 year since last office visit.    Last Written Prescription Date:  11/30/21  Last Fill Quantity: 90,  # refills: 0   Last office visit provider: 4/5/19      Requested Prescriptions   Pending Prescriptions Disp Refills     atorvastatin (LIPITOR) 20 MG tablet [Pharmacy Med Name: Atorvastatin Calcium Oral Tablet 20 MG] 90 tablet 0     Sig: TAKE ONE TABLET BY MOUTH ONE TIME DAILY       Statins Protocol Failed - 3/10/2022  8:13 AM        Failed - LDL on file in past 12 months     Recent Labs   Lab Test 04/05/19  1119                Failed - Recent (12 mo) or future (30 days) visit within the authorizing provider's specialty     Patient has had an office visit with the authorizing provider or a provider within the authorizing providers department within the previous 12 mos or has a future within next 30 days. See \"Patient Info\" tab in inbasket, or \"Choose Columns\" in Meds & Orders section of the refill encounter.              Passed - No abnormal creatine kinase in past 12 months     No lab results found.             Passed - Medication is active on med list        Passed - Patient is age 18 or older             Shweta Chiu RN 03/10/22 2:03 PM  "

## 2022-06-21 ENCOUNTER — TELEPHONE (OUTPATIENT)
Dept: NEUROLOGY | Facility: CLINIC | Age: 62
End: 2022-06-21
Payer: COMMERCIAL

## 2022-06-21 DIAGNOSIS — G35 RELAPSING REMITTING MULTIPLE SCLEROSIS (H): Primary | ICD-10-CM

## 2022-06-21 RX ORDER — GLATIRAMER 40 MG/ML
40 INJECTION, SOLUTION SUBCUTANEOUS
Qty: 12 ML | Refills: 0 | Status: SHIPPED | OUTPATIENT
Start: 2022-06-22 | End: 2022-07-22

## 2022-06-21 NOTE — TELEPHONE ENCOUNTER
Refill request for Glatiramer 40mg/mL. Pt last seen 6/24/21 and is currently due for follow up. Pt has been notified of this need. Will send in 30 day supply with zero refills.     Dai Chaves RN on 6/21/2022 at 12:22 PM

## 2022-06-27 DIAGNOSIS — E78.2 MIXED HYPERLIPIDEMIA: ICD-10-CM

## 2022-06-27 DIAGNOSIS — G35 RELAPSING REMITTING MULTIPLE SCLEROSIS (H): ICD-10-CM

## 2022-06-27 RX ORDER — ATORVASTATIN CALCIUM 20 MG/1
TABLET, FILM COATED ORAL
Qty: 90 TABLET | Refills: 0 | Status: SHIPPED | OUTPATIENT
Start: 2022-06-27 | End: 2022-11-10

## 2022-06-27 NOTE — TELEPHONE ENCOUNTER
"Routing refill request to provider for review/approval because:  Labs not current:  LDL  Patient needs to be seen because it has been more than 1 year since last office visit.    Last Written Prescription Date:  3/10/22  Last Fill Quantity: 90,  # refills: 0   Last office visit provider:  4/5/2019     Requested Prescriptions   Pending Prescriptions Disp Refills     atorvastatin (LIPITOR) 20 MG tablet [Pharmacy Med Name: Atorvastatin Calcium Oral Tablet 20 MG] 90 tablet 0     Sig: TAKE ONE TABLET BY MOUTH ONE TIME DAILY       Statins Protocol Failed - 6/27/2022  3:00 PM        Failed - LDL on file in past 12 months     Recent Labs   Lab Test 04/05/19  1119                Failed - Recent (12 mo) or future (30 days) visit within the authorizing provider's specialty     Patient has had an office visit with the authorizing provider or a provider within the authorizing providers department within the previous 12 mos or has a future within next 30 days. See \"Patient Info\" tab in inbasket, or \"Choose Columns\" in Meds & Orders section of the refill encounter.              Passed - No abnormal creatine kinase in past 12 months     No lab results found.             Passed - Medication is active on med list        Passed - Patient is age 18 or older             Olegario Gould RN 06/27/22 3:00 PM  "

## 2022-06-27 NOTE — TELEPHONE ENCOUNTER
Refill request for Zoloft. Pt last seen 6/24/21 and is currently due for follow up. Pt has been notified of this need. Will send in 14 day supply with zero refills.     Dai Chaves RN on 6/27/2022 at 10:18 AM

## 2022-07-21 DIAGNOSIS — G35 RELAPSING REMITTING MULTIPLE SCLEROSIS (H): ICD-10-CM

## 2022-07-22 RX ORDER — GLATIRAMER 40 MG/ML
40 INJECTION, SOLUTION SUBCUTANEOUS
Qty: 12 ML | Refills: 0 | Status: SHIPPED | OUTPATIENT
Start: 2022-07-22 | End: 2022-08-19

## 2022-08-04 DIAGNOSIS — G35 RELAPSING REMITTING MULTIPLE SCLEROSIS (H): ICD-10-CM

## 2022-08-04 NOTE — TELEPHONE ENCOUNTER
Refill request for sertraline.  Pt has upcoming appt on 1/2/23.    Medication T'd for review and signature      Antonia Miller LPN on 8/4/2022 at 10:21 AM

## 2022-08-18 DIAGNOSIS — G35 RELAPSING REMITTING MULTIPLE SCLEROSIS (H): ICD-10-CM

## 2022-08-18 NOTE — TELEPHONE ENCOUNTER
Reason for Call: Medication Refill Request    Has the patient contacted the pharmacy for the refill? Yes   Name of medication being requested:GLATIRAMER ACETATE 40MG/ML SOSY 40 q12  INJECT THE CONTENTS OF ONE SYRINGE UNDER THE SKIN THREE TIMES WEEKLY  Provider who prescribed the medication: MARY  [facility]  Date medication is needed: ASAP

## 2022-08-19 RX ORDER — GLATIRAMER 40 MG/ML
40 INJECTION, SOLUTION SUBCUTANEOUS
Qty: 12 ML | Refills: 5 | Status: SHIPPED | OUTPATIENT
Start: 2022-08-19 | End: 2023-01-02

## 2022-08-19 NOTE — TELEPHONE ENCOUNTER
Refill request for glatiramer   Follow up scheduled for 1/2/23  Medication T'd for review and signature  Dorie Lopez CMA on 8/19/2022 at 8:14 AM

## 2022-10-15 ENCOUNTER — HEALTH MAINTENANCE LETTER (OUTPATIENT)
Age: 62
End: 2022-10-15

## 2022-11-08 DIAGNOSIS — G35 RELAPSING REMITTING MULTIPLE SCLEROSIS (H): ICD-10-CM

## 2022-11-08 DIAGNOSIS — E78.2 MIXED HYPERLIPIDEMIA: ICD-10-CM

## 2022-11-08 NOTE — TELEPHONE ENCOUNTER
Refill request for: modafinil (PROVIGIL) 200 MG tablet  Directions: Take 1 tablet (200 mg) by mouth 2 times daily    LOV: 6/24/21  NOV: 1/2/23    30 day supply with 5 refills Medication T'd for review and signature  Dorie Lopez CMA on 11/8/2022 at 3:42 PM

## 2022-11-09 RX ORDER — MODAFINIL 200 MG/1
200 TABLET ORAL 2 TIMES DAILY
Qty: 60 TABLET | Refills: 5 | Status: SHIPPED | OUTPATIENT
Start: 2022-11-09 | End: 2023-01-02

## 2022-11-10 RX ORDER — ATORVASTATIN CALCIUM 20 MG/1
20 TABLET, FILM COATED ORAL DAILY
Qty: 90 TABLET | Refills: 0 | Status: SHIPPED | OUTPATIENT
Start: 2022-11-10 | End: 2023-03-02

## 2022-11-10 NOTE — TELEPHONE ENCOUNTER
"Routing refill request to provider for review/approval because:  Labs not current:  LDL  Patient needs to be seen because it has been more than 3 years since last office visit.    Last Written Prescription Date:  6/27/22  Last Fill Quantity: 90,  # refills: 0   Last office visit provider:  4/5/2019     Requested Prescriptions   Pending Prescriptions Disp Refills     atorvastatin (LIPITOR) 20 MG tablet [Pharmacy Med Name: Atorvastatin Calcium Oral Tablet 20 MG] 90 tablet 0     Sig: TAKE ONE TABLET BY MOUTH ONE TIME DAILY       Statins Protocol Failed - 11/10/2022  1:30 PM        Failed - LDL on file in past 12 months     Recent Labs   Lab Test 04/05/19  1119                Failed - Recent (12 mo) or future (30 days) visit within the authorizing provider's specialty     Patient has had an office visit with the authorizing provider or a provider within the authorizing providers department within the previous 12 mos or has a future within next 30 days. See \"Patient Info\" tab in inbasket, or \"Choose Columns\" in Meds & Orders section of the refill encounter.              Passed - No abnormal creatine kinase in past 12 months     No lab results found.             Passed - Medication is active on med list        Passed - Patient is age 18 or older             Olegario Gould RN 11/10/22 1:31 PM  "

## 2023-01-02 ENCOUNTER — OFFICE VISIT (OUTPATIENT)
Dept: NEUROLOGY | Facility: CLINIC | Age: 63
End: 2023-01-02
Payer: COMMERCIAL

## 2023-01-02 VITALS
HEIGHT: 73 IN | BODY MASS INDEX: 25.31 KG/M2 | WEIGHT: 191 LBS | SYSTOLIC BLOOD PRESSURE: 132 MMHG | HEART RATE: 72 BPM | DIASTOLIC BLOOD PRESSURE: 84 MMHG

## 2023-01-02 DIAGNOSIS — G35 RELAPSING REMITTING MULTIPLE SCLEROSIS (H): Primary | ICD-10-CM

## 2023-01-02 DIAGNOSIS — M48.062 SPINAL STENOSIS OF LUMBAR REGION WITH NEUROGENIC CLAUDICATION: ICD-10-CM

## 2023-01-02 PROCEDURE — 99214 OFFICE O/P EST MOD 30 MIN: CPT | Performed by: PSYCHIATRY & NEUROLOGY

## 2023-01-02 RX ORDER — MODAFINIL 200 MG/1
200 TABLET ORAL 2 TIMES DAILY
Qty: 60 TABLET | Refills: 5 | Status: SHIPPED | OUTPATIENT
Start: 2023-01-02 | End: 2023-07-06

## 2023-01-02 RX ORDER — GLATIRAMER 40 MG/ML
40 INJECTION, SOLUTION SUBCUTANEOUS
Qty: 12 ML | Refills: 11 | Status: SHIPPED | OUTPATIENT
Start: 2023-01-02 | End: 2023-11-30

## 2023-01-02 RX ORDER — ZINC GLUCONATE 50 MG
50 TABLET ORAL DAILY
COMMUNITY

## 2023-01-02 NOTE — NURSING NOTE
Chief Complaint   Patient presents with     Multiple Sclerosis     Annual follow up- patient reports sx are stable      Dorie Lopez CMA on 1/2/2023 at 9:40 AM

## 2023-01-02 NOTE — LETTER
2023         RE: Olegario Grady  [address] [facility] [address]        Dear Colleague,    Thank you for referring your patient, Olegario Grady, to the [facility] [place]. Please see a copy of my visit note below.    NEUROLOGY FOLLOW UP VISIT  NOTE       [facility] [place]  [address] [place] [address]  Tel: [phone]  Fax: [phone]  [email or web address]     Olegario Grady,  1960, MRN [id number]  PCP: Cam Hill  Date: 2023      ASSESSMENT & PLAN     Visit Diagnosis  1. Relapsing remitting multiple sclerosis (H)  2. Spinal stenosis of lumbar region with neurogenic claudication     Relapsing remitting multiple sclerosis  Pleasant 62-year-old male with history of depression, relapsing remitting multiple sclerosis who returns for follow-up.  His demyelinating disease has remained fairly stable.  He had repeat MRI of the brain, cervical and thoracic spine in 2021 that did not show any new demyelinating lesions.  I have recommended:    1.  Continue Copaxone 40 mg subcu 3 times a week  2. Continue Provigil 200 mg 3 times daily  3.  Continue Zoloft 50 mg daily  4.  Patient is taking vitamin D 5000 units every day  5.  Regular follow-up will be in 1 year    Thank you again for this referral, please feel free to contact me if you have any questions.    Ross Cabello MD  [facility]  (Former[facility], [facility]     HISTORY OF PRESENT ILLNESS     Patient is 62-year-old male with history of relapsing remitting multiple sclerosis who is being followed in our clinic and fortunately had relatively stable condition.  He has continued on Copaxone and for fatigue takes Provigil and Zoloft.  He was last seen on 2021 when he was continued on Copaxone, Provigil and Zoloft.  MRI of the brain, cervical and thoracic spine were done that showed changes due to known  multiple sclerosis but no enhancing lesion were seen.  Since last visit he reports his symptoms are stable. He has no specific complaints and wants his medication refilled.    Previously he had developed some lower back pain and had MRI that showed severe L3-L4 stenosis and had decompressive surgery done.  Since his last visit he reports no significant change in his symptoms.  Occasionally he has some balance issues      PROBLEM LIST   Patient Active Problem List   Diagnosis Code     Relapsing remitting multiple sclerosis (H) G35     Depression F32.A     Spinal stenosis of lumbar region with neurogenic claudication M48.062     Tobacco user Z72.0         PAST MEDICAL & SURGICAL HISTORY     Past Medical History:   Patient  has no past medical history on file.    Surgical History:  He  has a past surgical history that includes Laminectomy lumbar one level; Fusion lumbar anterior one level; IR Lumbar Epidural Steroid Injection (9/13/2011); Posterior Laminectomy / Decompression Lumbar Spine; Lumbar Fusion; Arthroscopy shoulder rotator cuff repair (Right); Orif Femur Fracture (Right); and Lumbar Laminectomy (Bilateral, 2/26/2019).     SOCIAL HISTORY     Reviewed, and he  reports that he has quit smoking. He has never used smokeless tobacco. He reports current alcohol use.     FAMILY HISTORY     Reviewed, and family history includes Coronary Artery Disease in his father; Heart Failure in his mother; Hyperlipidemia in his father; Hypertension in his father; No Known Problems in his brother, brother, daughter, sister, sister, son, son, and son.     ALLERGIES     No Known Allergies      REVIEW OF SYSTEMS     A 12 point review of system was performed and was negative except as outlined in the history of present illness.     HOME MEDICATIONS     Current Outpatient Rx   Medication Sig Dispense Refill     atorvastatin (LIPITOR) 20 MG tablet Take 1 tablet (20 mg) by mouth daily 90 tablet 0     [employer] [employer] (VITAMIN C)  "CHEW        glatiramer acetate 40 MG/ML injection Inject 40 mg Subcutaneous three times a week 12 mL 5     modafinil (PROVIGIL) 200 MG tablet Take 1 tablet (200 mg) by mouth 2 times daily 60 tablet 5     MULTIPLE VITAMIN PO Take 1 tablet by mouth       sertraline (ZOLOFT) 50 MG tablet Take 1 tablet (50 mg) by mouth daily 90 tablet 1     Vitamin D3 (CHOLECALCIFEROL) 125 MCG (5000 UT) tablet Take 5,000 Units by mouth       zinc gluconate 50 MG tablet Take 50 mg by mouth daily           PHYSICAL EXAM     Vital signs  /84 (BP Location: Left arm, Patient Position: Sitting)   Pulse 72   Ht 1.854 m (6' 1\")   Wt 86.6 kg (191 lb)   BMI 25.20 kg/m      Weight:   191 lbs 0 oz    Patient is alert and oriented x4 in no acute distress. Vital signs were reviewed and are documented in electronic medical record. Neck was supple, no carotid bruits, thyromegaly, JVD, or lymphadenopathy was noted.   NEUROLOGY EXAM:    Patient s speech was normal with no aphasia or dysarthria. Mentation, and affect were also normal.     Funduscopic exam was normal, with normal cup to disc ratio. Cranial nerves II -XII were intact.     Patient had normal mass, tone and motor strength was 5/5 in all extremities without pronator drift.     Sensation was intact to light touch, pinprick, and vibratory sensation.     Reflexes were 2+ symmetrical with downgoing toes.     No dysmetria noted on FNF or HKS. Romberg was negative.    Gait testing was normal. Able to walk on toes/heels     PERTINENT DIAGNOSTIC STUDIES     Following studies were reviewed:     MRI BRAIN, CERVICAL AND THORACIC SPINE 7/12/2021  MRI BRAIN:  1.  Small nonspecific foci of nonenhancing T2 hyperintensities scattered within the supratentorial brain parenchyma are unchanged versus 2018.  2.  No enhancing plaques or other findings to suggest active demyelination.  3.  No acute intracranial process.     MRI CERVICAL SPINE:  1.  Possible subtle focus of chronic demyelination within the " right lateral cervical spinal cord at C5-C6 level unchanged versus prior.  2.  No definite new spinal cord signal abnormality to indicate interval demyelination since 2018.  3.  No pathologic enhancement to indicate active demyelination within the cervical spinal cord.  4.  Degenerative changes without central spinal canal stenosis. Edema associated with right-sided C2-C3 facet arthropathy. Up to mild to moderate neural foraminal narrowing on the right at C3-C4. Mild foraminal narrowing elsewhere.     MRI THORACIC SPINE:  1.  Unchanged chronic demyelinating plaques within the thoracic spinal cord.  2.  No new lesion to indicate interval demyelination since 2018. No pathologic enhancement to indicate active demyelination within the thoracic spinal cord.    LUMBAR  SPINE 2/1/19  1. Canal decompression and posterior instrumented fusion L4-L5. Canal is   well decompressed dorsally without canal stenosis. Neural foramina are   partially obscured without findings for high-grade foraminal stenosis.    2. Although the neural foramina are significantly obscured by   susceptibility artifact at L5-S1, there appears to be severe right and   moderate left neural foraminal stenosis at this level.     3. Severe spinal canal stenosis L3-L4 with left greater than right   lateral recess stenosis. Severe left and more moderate right neural   foraminal stenosis.     4. At L2-L3, there is moderate spinal canal stenosis without significant   foraminal stenosis.    MRI BRAIN: 8/30/18  1. A few small nonspecific white matter hyperintensities are new versus   the prior exam but do not enhance compatible with areas of now chronic   demyelination that have occurred in the interval. No pathologic   enhancement to indicate active demyelination   in the brain.  2. No superimposed acute intracranial finding.    MRI CERVICAL SPINE 8/30/2018  1. Small focus of nonenhancing T2 hyperintensity in the right spinal cord   at the C5-C6 level compatible  with focus of chronic demyelination is   unchanged from prior. No new spinal cord signal abnormality. No pathologic   enhancement.    MRI THORACIC SPINE 8/30/2018  1. Multiple patchy areas of nonenhancing T2 signal hyperintensity within   the thoracic spinal cord are not definitely changed versus prior.  2. No pathologic enhancement to indicate active demyelination in the   thoracic spinal cord.        PERTINENT LABS  Following labs were reviewed:  No visits with results within 3 Month(s) from this visit.   Latest known visit with results is:   [facility]   Component Date Value     Color Urine 04/05/2019 Yellow      Appearance Urine 04/05/2019 Clear      Glucose Urine 04/05/2019 Negative      Bilirubin Urine 04/05/2019 Negative      Ketones Urine 04/05/2019 Negative      Specific Gravity Urine 04/05/2019 1.020      Blood Urine 04/05/2019 Negative      pH Urine 04/05/2019 7.0      Protein Albumin Urine 04/05/2019 Negative      Urobilinogen Urine 04/05/2019 0.2 E.U./dL      Nitrite Urine 04/05/2019 Negative      Leukocyte Esterase Urine 04/05/2019 Negative      TSH 04/05/2019 1.21      Cholesterol 04/05/2019 216 (H)      Triglycerides 04/05/2019 220 (H)      Direct Measure HDL 04/05/2019 47      LDL Cholesterol Calculat* 04/05/2019 125      Patient Fasting > 8hrs? 04/05/2019 Unknown      Prostate Specific Antige* 04/05/2019 0.6      Hepatitis C Antibody 04/05/2019 Negative      Hemoglobin A1C 04/05/2019 5.1          Total time spent for face to face visit, reviewing labs/imaging studies, counseling and coordination of care was: 30 Minutes spent on the date of the encounter doing chart review, review of outside records, review of test results, interpretation of tests, patient visit and documentation       This note was dictated using voice recognition software.  Any grammatical or context distortions are unintentional and inherent to the software.    No orders of the defined types were  placed in this encounter.     New Prescriptions    No medications on file     Modified Medications    No medications on file                     Again, thank you for allowing me to participate in the care of your patient.        Sincerely,        Ross Cabello MD

## 2023-01-02 NOTE — PROGRESS NOTES
NEUROLOGY FOLLOW UP VISIT  NOTE       [facility] NEUROLOGY [place]  [address] [place] [address]  Tel: [phone]  Fax: [phone]  [email or web address]     Olegario Grady,  1960, MRN [id number]  PCP: Cam Hill  Date: 2023      ASSESSMENT & PLAN     Visit Diagnosis  1. Relapsing remitting multiple sclerosis (H)  2. Spinal stenosis of lumbar region with neurogenic claudication     Relapsing remitting multiple sclerosis  Pleasant 62-year-old male with history of depression, relapsing remitting multiple sclerosis who returns for follow-up.  His demyelinating disease has remained fairly stable.  He had repeat MRI of the brain, cervical and thoracic spine in 2021 that did not show any new demyelinating lesions.  I have recommended:    1.  Continue Copaxone 40 mg subcu 3 times a week  2. Continue Provigil 200 mg 3 times daily  3.  Continue Zoloft 50 mg daily  4.  Patient is taking vitamin D 5000 units every day  5.  Regular follow-up will be in 1 year    Thank you again for this referral, please feel free to contact me if you have any questions.    Ross Cabello MD  [facility]  ([facility], [facility]     HISTORY OF PRESENT ILLNESS     Patient is 62-year-old male with history of relapsing remitting multiple sclerosis who is being followed in our clinic and fortunately had relatively stable condition.  He has continued on Copaxone and for fatigue takes Provigil and Zoloft.  He was last seen on 2021 when he was continued on Copaxone, Provigil and Zoloft.  MRI of the brain, cervical and thoracic spine were done that showed changes due to known multiple sclerosis but no enhancing lesion were seen.  Since last visit he reports his symptoms are stable. He has no specific complaints and wants his medication refilled.    Previously he had developed some lower back pain and had MRI that showed severe L3-L4  stenosis and had decompressive surgery done.  Since his last visit he reports no significant change in his symptoms.  Occasionally he has some balance issues      PROBLEM LIST   Patient Active Problem List   Diagnosis Code     Relapsing remitting multiple sclerosis (H) G35     Depression F32.A     Spinal stenosis of lumbar region with neurogenic claudication M48.062     Tobacco user Z72.0         PAST MEDICAL & SURGICAL HISTORY     Past Medical History:   Patient  has no past medical history on file.    Surgical History:  He  has a past surgical history that includes Laminectomy lumbar one level; Fusion lumbar anterior one level; IR Lumbar Epidural Steroid Injection (9/13/2011); Posterior Laminectomy / Decompression Lumbar Spine; Lumbar Fusion; Arthroscopy shoulder rotator cuff repair (Right); Orif Femur Fracture (Right); and Lumbar Laminectomy (Bilateral, 2/26/2019).     SOCIAL HISTORY     Reviewed, and he  reports that he has quit smoking. He has never used smokeless tobacco. He reports current alcohol use.     FAMILY HISTORY     Reviewed, and family history includes Coronary Artery Disease in his father; Heart Failure in his mother; Hyperlipidemia in his father; Hypertension in his father; No Known Problems in his brother, brother, daughter, sister, sister, son, son, and son.     ALLERGIES     No Known Allergies      REVIEW OF SYSTEMS     A 12 point review of system was performed and was negative except as outlined in the history of present illness.     HOME MEDICATIONS     Current Outpatient Rx   Medication Sig Dispense Refill     atorvastatin (LIPITOR) 20 MG tablet Take 1 tablet (20 mg) by mouth daily 90 tablet 0     [employer] (VITAMIN C) CHEW        glatiramer acetate 40 MG/ML injection Inject 40 mg Subcutaneous three times a week 12 mL 5     modafinil (PROVIGIL) 200 MG tablet Take 1 tablet (200 mg) by mouth 2 times daily 60 tablet 5     MULTIPLE VITAMIN PO Take 1 tablet by mouth       sertraline  "(ZOLOFT) 50 MG tablet Take 1 tablet (50 mg) by mouth daily 90 tablet 1     Vitamin D3 (CHOLECALCIFEROL) 125 MCG (5000 UT) tablet Take 5,000 Units by mouth       zinc gluconate 50 MG tablet Take 50 mg by mouth daily           PHYSICAL EXAM     Vital signs  /84 (BP Location: Left arm, Patient Position: Sitting)   Pulse 72   Ht 1.854 m (6' 1\")   Wt 86.6 kg (191 lb)   BMI 25.20 kg/m      Weight:   191 lbs 0 oz    Patient is alert and oriented x4 in no acute distress. Vital signs were reviewed and are documented in electronic medical record. Neck was supple, no carotid bruits, thyromegaly, JVD, or lymphadenopathy was noted.   NEUROLOGY EXAM:    Patient s speech was normal with no aphasia or dysarthria. Mentation, and affect were also normal.     Funduscopic exam was normal, with normal cup to disc ratio. Cranial nerves II -XII were intact.     Patient had normal mass, tone and motor strength was 5/5 in all extremities without pronator drift.     Sensation was intact to light touch, pinprick, and vibratory sensation.     Reflexes were 2+ symmetrical with downgoing toes.     No dysmetria noted on FNF or HKS. Romberg was negative.    Gait testing was normal. Able to walk on toes/heels     PERTINENT DIAGNOSTIC STUDIES     Following studies were reviewed:     MRI BRAIN, CERVICAL AND THORACIC SPINE 7/12/2021  MRI BRAIN:  1.  Small nonspecific foci of nonenhancing T2 hyperintensities scattered within the supratentorial brain parenchyma are unchanged versus 2018.  2.  No enhancing plaques or other findings to suggest active demyelination.  3.  No acute intracranial process.     MRI CERVICAL SPINE:  1.  Possible subtle focus of chronic demyelination within the right lateral cervical spinal cord at C5-C6 level unchanged versus prior.  2.  No definite new spinal cord signal abnormality to indicate interval demyelination since 2018.  3.  No pathologic enhancement to indicate active demyelination within the cervical spinal " cord.  4.  Degenerative changes without central spinal canal stenosis. Edema associated with right-sided C2-C3 facet arthropathy. Up to mild to moderate neural foraminal narrowing on the right at C3-C4. Mild foraminal narrowing elsewhere.     MRI THORACIC SPINE:  1.  Unchanged chronic demyelinating plaques within the thoracic spinal cord.  2.  No new lesion to indicate interval demyelination since 2018. No pathologic enhancement to indicate active demyelination within the thoracic spinal cord.    LUMBAR  SPINE 2/1/19  1. Canal decompression and posterior instrumented fusion L4-L5. Canal is   well decompressed dorsally without canal stenosis. Neural foramina are   partially obscured without findings for high-grade foraminal stenosis.    2. Although the neural foramina are significantly obscured by   susceptibility artifact at L5-S1, there appears to be severe right and   moderate left neural foraminal stenosis at this level.     3. Severe spinal canal stenosis L3-L4 with left greater than right   lateral recess stenosis. Severe left and more moderate right neural   foraminal stenosis.     4. At L2-L3, there is moderate spinal canal stenosis without significant   foraminal stenosis.    MRI BRAIN: 8/30/18  1. A few small nonspecific white matter hyperintensities are new versus   the prior exam but do not enhance compatible with areas of now chronic   demyelination that have occurred in the interval. No pathologic   enhancement to indicate active demyelination   in the brain.  2. No superimposed acute intracranial finding.    MRI CERVICAL SPINE 8/30/2018  1. Small focus of nonenhancing T2 hyperintensity in the right spinal cord   at the C5-C6 level compatible with focus of chronic demyelination is   unchanged from prior. No new spinal cord signal abnormality. No pathologic   enhancement.    MRI THORACIC SPINE 8/30/2018  1. Multiple patchy areas of nonenhancing T2 signal hyperintensity within   the thoracic spinal cord  are not definitely changed versus prior.  2. No pathologic enhancement to indicate active demyelination in the   thoracic spinal cord.        PERTINENT LABS  Following labs were reviewed:  No visits with results within 3 Month(s) from this visit.   Latest known visit with results is:   Office Visit - [facility] on 04/05/2019   Component Date Value     Color Urine 04/05/2019 Yellow      Appearance Urine 04/05/2019 Clear      Glucose Urine 04/05/2019 Negative      Bilirubin Urine 04/05/2019 Negative      Ketones Urine 04/05/2019 Negative      Specific Gravity Urine 04/05/2019 1.020      Blood Urine 04/05/2019 Negative      pH Urine 04/05/2019 7.0      Protein Albumin Urine 04/05/2019 Negative      Urobilinogen Urine 04/05/2019 0.2 E.U./dL      Nitrite Urine 04/05/2019 Negative      Leukocyte Esterase Urine 04/05/2019 Negative      TSH 04/05/2019 1.21      Cholesterol 04/05/2019 216 (H)      Triglycerides 04/05/2019 220 (H)      Direct Measure HDL 04/05/2019 47      LDL Cholesterol Calculat* 04/05/2019 125      Patient Fasting > 8hrs? 04/05/2019 Unknown      Prostate Specific Antige* 04/05/2019 0.6      Hepatitis C Antibody 04/05/2019 Negative      Hemoglobin A1C 04/05/2019 5.1          Total time spent for face to face visit, reviewing labs/imaging studies, counseling and coordination of care was: 30 Minutes spent on the date of the encounter doing chart review, review of outside records, review of test results, interpretation of tests, patient visit and documentation       This note was dictated using voice recognition software.  Any grammatical or context distortions are unintentional and inherent to the software.    No orders of the defined types were placed in this encounter.     New Prescriptions    No medications on file     Modified Medications    No medications on file

## 2023-03-01 DIAGNOSIS — E78.2 MIXED HYPERLIPIDEMIA: ICD-10-CM

## 2023-03-02 RX ORDER — ATORVASTATIN CALCIUM 20 MG/1
20 TABLET, FILM COATED ORAL DAILY
Qty: 90 TABLET | Refills: 0 | Status: SHIPPED | OUTPATIENT
Start: 2023-03-02 | End: 2023-04-14

## 2023-03-02 NOTE — TELEPHONE ENCOUNTER
"Routing refill request to provider for review/approval because:  Patient needs to be seen because it has been more than 1 year since last office visit.  LDL last 4/5/2019    Last Written Prescription Date:  11/10/2022  Last Fill Quantity: 90,  # refills: 0   Last office visit provider:  2/21/2019     Requested Prescriptions   Pending Prescriptions Disp Refills     atorvastatin (LIPITOR) 20 MG tablet [Pharmacy Med Name: Atorvastatin Calcium Oral Tablet 20 MG] 90 tablet 0     Sig: Take 1 tablet (20 mg) by mouth daily       Statins Protocol Failed - 3/1/2023  8:47 AM        Failed - LDL on file in past 12 months     Recent Labs   Lab Test 04/05/19  1119                Failed - Recent (12 mo) or future (30 days) visit within the authorizing provider's specialty     Patient has had an office visit with the authorizing provider or a provider within the authorizing providers department within the previous 12 mos or has a future within next 30 days. See \"Patient Info\" tab in inbasket, or \"Choose Columns\" in Meds & Orders section of the refill encounter.              Passed - No abnormal creatine kinase in past 12 months     No lab results found.             Passed - Medication is active on med list        Passed - Patient is age 18 or older             Fanny Higgins RN 03/01/23 9:46 PM  "

## 2023-03-02 NOTE — TELEPHONE ENCOUNTER
3/2/2023@10:43  Called pt; no answer; left message for pt to call and make an appointment w/ pcp to get Rx refilled.

## 2023-03-26 ENCOUNTER — HEALTH MAINTENANCE LETTER (OUTPATIENT)
Age: 63
End: 2023-03-26

## 2023-04-09 ASSESSMENT — ENCOUNTER SYMPTOMS
PALPITATIONS: 0
HEMATOCHEZIA: 0
CONSTIPATION: 1
SHORTNESS OF BREATH: 0
ARTHRALGIAS: 0
DIZZINESS: 0
FREQUENCY: 1
PARESTHESIAS: 0
DYSURIA: 0
EYE PAIN: 0
NERVOUS/ANXIOUS: 0
CHILLS: 0
MYALGIAS: 0
HEARTBURN: 0
ABDOMINAL PAIN: 0
HEMATURIA: 0
FEVER: 0
DIARRHEA: 0
SORE THROAT: 0
JOINT SWELLING: 0
COUGH: 0
NAUSEA: 0
HEADACHES: 0
WEAKNESS: 0

## 2023-04-14 ENCOUNTER — OFFICE VISIT (OUTPATIENT)
Dept: INTERNAL MEDICINE | Facility: CLINIC | Age: 63
End: 2023-04-14
Payer: COMMERCIAL

## 2023-04-14 VITALS
DIASTOLIC BLOOD PRESSURE: 76 MMHG | HEIGHT: 74 IN | TEMPERATURE: 97.9 F | WEIGHT: 205 LBS | RESPIRATION RATE: 12 BRPM | BODY MASS INDEX: 26.31 KG/M2 | OXYGEN SATURATION: 97 % | HEART RATE: 74 BPM | SYSTOLIC BLOOD PRESSURE: 115 MMHG

## 2023-04-14 DIAGNOSIS — F33.42 RECURRENT MAJOR DEPRESSIVE DISORDER, IN FULL REMISSION (H): ICD-10-CM

## 2023-04-14 DIAGNOSIS — E78.2 MIXED HYPERLIPIDEMIA: ICD-10-CM

## 2023-04-14 DIAGNOSIS — Z87.891 PERSONAL HISTORY OF TOBACCO USE: ICD-10-CM

## 2023-04-14 DIAGNOSIS — Z00.00 ANNUAL PHYSICAL EXAM: Primary | ICD-10-CM

## 2023-04-14 DIAGNOSIS — Z12.11 SCREEN FOR COLON CANCER: ICD-10-CM

## 2023-04-14 DIAGNOSIS — G35 RELAPSING REMITTING MULTIPLE SCLEROSIS (H): ICD-10-CM

## 2023-04-14 DIAGNOSIS — Z12.5 SCREENING FOR PROSTATE CANCER: ICD-10-CM

## 2023-04-14 DIAGNOSIS — R35.0 BENIGN PROSTATIC HYPERPLASIA WITH URINARY FREQUENCY: ICD-10-CM

## 2023-04-14 DIAGNOSIS — N40.1 BENIGN PROSTATIC HYPERPLASIA WITH URINARY FREQUENCY: ICD-10-CM

## 2023-04-14 PROBLEM — Z72.0 TOBACCO USER: Status: RESOLVED | Noted: 2020-06-24 | Resolved: 2023-04-14

## 2023-04-14 PROBLEM — M48.062 SPINAL STENOSIS OF LUMBAR REGION WITH NEUROGENIC CLAUDICATION: Status: RESOLVED | Noted: 2019-02-21 | Resolved: 2023-04-14

## 2023-04-14 LAB
ALBUMIN SERPL BCG-MCNC: 4.6 G/DL (ref 3.5–5.2)
ALBUMIN UR-MCNC: NEGATIVE MG/DL
ALP SERPL-CCNC: 92 U/L (ref 40–129)
ALT SERPL W P-5'-P-CCNC: 46 U/L (ref 10–50)
ANION GAP SERPL CALCULATED.3IONS-SCNC: 13 MMOL/L (ref 7–15)
APPEARANCE UR: CLEAR
AST SERPL W P-5'-P-CCNC: 35 U/L (ref 10–50)
BILIRUB SERPL-MCNC: 0.8 MG/DL
BILIRUB UR QL STRIP: NEGATIVE
BUN SERPL-MCNC: 13.8 MG/DL (ref 8–23)
CALCIUM SERPL-MCNC: 9.7 MG/DL (ref 8.8–10.2)
CHLORIDE SERPL-SCNC: 106 MMOL/L (ref 98–107)
CHOLEST SERPL-MCNC: 159 MG/DL
COLOR UR AUTO: YELLOW
CREAT SERPL-MCNC: 0.94 MG/DL (ref 0.67–1.17)
DEPRECATED HCO3 PLAS-SCNC: 24 MMOL/L (ref 22–29)
ERYTHROCYTE [DISTWIDTH] IN BLOOD BY AUTOMATED COUNT: 12.8 % (ref 10–15)
GFR SERPL CREATININE-BSD FRML MDRD: >90 ML/MIN/1.73M2
GLUCOSE SERPL-MCNC: 95 MG/DL (ref 70–99)
GLUCOSE UR STRIP-MCNC: NEGATIVE MG/DL
HCT VFR BLD AUTO: 45.3 % (ref 40–53)
HDLC SERPL-MCNC: 52 MG/DL
HGB BLD-MCNC: 14.7 G/DL (ref 13.3–17.7)
HGB UR QL STRIP: NEGATIVE
KETONES UR STRIP-MCNC: NEGATIVE MG/DL
LDLC SERPL CALC-MCNC: 90 MG/DL
LEUKOCYTE ESTERASE UR QL STRIP: NEGATIVE
MCH RBC QN AUTO: 29.1 PG (ref 26.5–33)
MCHC RBC AUTO-ENTMCNC: 32.5 G/DL (ref 31.5–36.5)
MCV RBC AUTO: 90 FL (ref 78–100)
NITRATE UR QL: NEGATIVE
NONHDLC SERPL-MCNC: 107 MG/DL
PH UR STRIP: 5.5 [PH] (ref 5–8)
PLATELET # BLD AUTO: 202 10E3/UL (ref 150–450)
POTASSIUM SERPL-SCNC: 4.3 MMOL/L (ref 3.4–5.3)
PROT SERPL-MCNC: 6.9 G/DL (ref 6.4–8.3)
PSA SERPL DL<=0.01 NG/ML-MCNC: 1.07 NG/ML (ref 0–4.5)
RBC # BLD AUTO: 5.06 10E6/UL (ref 4.4–5.9)
SODIUM SERPL-SCNC: 143 MMOL/L (ref 136–145)
SP GR UR STRIP: >=1.03 (ref 1–1.03)
TRIGL SERPL-MCNC: 86 MG/DL
TSH SERPL DL<=0.005 MIU/L-ACNC: 1.33 UIU/ML (ref 0.3–4.2)
UROBILINOGEN UR STRIP-ACNC: 0.2 E.U./DL
WBC # BLD AUTO: 5.7 10E3/UL (ref 4–11)

## 2023-04-14 PROCEDURE — 99214 OFFICE O/P EST MOD 30 MIN: CPT | Mod: 25 | Performed by: INTERNAL MEDICINE

## 2023-04-14 PROCEDURE — 81003 URINALYSIS AUTO W/O SCOPE: CPT | Performed by: INTERNAL MEDICINE

## 2023-04-14 PROCEDURE — 80061 LIPID PANEL: CPT | Performed by: INTERNAL MEDICINE

## 2023-04-14 PROCEDURE — 84443 ASSAY THYROID STIM HORMONE: CPT | Performed by: INTERNAL MEDICINE

## 2023-04-14 PROCEDURE — G0296 VISIT TO DETERM LDCT ELIG: HCPCS | Performed by: INTERNAL MEDICINE

## 2023-04-14 PROCEDURE — 80053 COMPREHEN METABOLIC PANEL: CPT | Performed by: INTERNAL MEDICINE

## 2023-04-14 PROCEDURE — 85027 COMPLETE CBC AUTOMATED: CPT | Performed by: INTERNAL MEDICINE

## 2023-04-14 PROCEDURE — 36415 COLL VENOUS BLD VENIPUNCTURE: CPT | Performed by: INTERNAL MEDICINE

## 2023-04-14 PROCEDURE — 99386 PREV VISIT NEW AGE 40-64: CPT | Performed by: INTERNAL MEDICINE

## 2023-04-14 PROCEDURE — G0103 PSA SCREENING: HCPCS | Performed by: INTERNAL MEDICINE

## 2023-04-14 RX ORDER — ATORVASTATIN CALCIUM 20 MG/1
20 TABLET, FILM COATED ORAL DAILY
Qty: 90 TABLET | Refills: 4 | Status: SHIPPED | OUTPATIENT
Start: 2023-06-01 | End: 2024-07-08

## 2023-04-14 RX ORDER — TAMSULOSIN HYDROCHLORIDE 0.4 MG/1
0.4 CAPSULE ORAL DAILY
Qty: 90 CAPSULE | Refills: 4 | Status: SHIPPED | OUTPATIENT
Start: 2023-04-14 | End: 2024-05-09

## 2023-04-14 NOTE — PROGRESS NOTES
Lung Cancer Screening Shared Decision Making Visit     Olegario Grady, a 62 year old male, is eligible for lung cancer screening    History   Smoking Status     Former   Smokeless Tobacco     Never       I have discussed with patient the risks and benefits of screening for lung cancer with low-dose CT.     The risks include:    radiation exposure: one low dose chest CT has as much ionizing radiation as about 15 chest x-rays, or 6 months of background radiation living in [place]      false positives: most findings/nodules are NOT cancer, but some might still require additional diagnostic evaluation, including biopsy    over-diagnosis: some slow growing cancers that might never have been clinically significant will be detected and treated unnecessarily     The benefit of early detection of lung cancer is contingent upon adherence to annual screening or more frequent follow up if indicated.     Furthermore, to benefit from screening, Olegario must be willing and able to undergo diagnostic procedures, if indicated. Although no specific guide is available for determining severity of comorbidities, it is reasonable to withhold screening in patients who have greater mortality risk from other diseases.     We did discuss that the best way to prevent lung cancer is to not smoke.    Some patients may value a numeric estimation of lung cancer risk when evaluating if lung cancer screening is right for them, here is one calculator:    ShouldIScreen  Answers for HPI/ROS submitted by the patient on 4/9/2023  Getting at least 3 servings of Calcium per day:: NO  Diet:: Other  Taking medications regularly:: Yes  Medication side effects:: None  Bi-annual eye exam:: Yes  Dental care twice a year:: Yes  Sleep apnea or symptoms of sleep apnea:: None  abdominal pain: No  Blood in stool: No  Blood in urine: No  chest pain: No  chills: No  congestion: No  constipation: Yes  cough: No  diarrhea: No  dizziness: No  ear pain: No  eye pain:  No  nervous/anxious: No  fever: No  frequency: Yes  genital sores: No  headaches: No  hearing loss: No  heartburn: No  arthralgias: No  joint swelling: No  peripheral edema: No  mood changes: No  myalgias: No  nausea: No  dysuria: No  palpitations: No  Skin sensation changes: No  sore throat: No  urgency: No  rash: No  shortness of breath: No  visual disturbance: No  weakness: No  impotence: No  penile discharge: No  Additional concerns today:: No  Duration of exercise:: Less than 15 minutes

## 2023-04-14 NOTE — PROGRESS NOTES
Office Visit - Physical   Olegario Grady   62 year old  male    Date of visit: 4/14/2023  Physician: Cam Hill MD     Assessment and Plan   1. Annual physical exam  This is a 62-year-old man with issues as discussed below.  Discussed immunizations and he defers for now    2. Screening for prostate cancer  - Prostate Specific Antigen Screen; Future    3. Screen for colon cancer  - Colonoscopy Screening  Referral; Future    4. Mixed hyperlipidemia  Continue atorvastatin  - atorvastatin (LIPITOR) 20 MG tablet; Take 1 tablet (20 mg) by mouth daily  Dispense: 90 tablet; Refill: 4  - CBC with platelets; Future  - Comprehensive metabolic panel; Future  - Lipid panel reflex to direct LDL Fasting; Future  - TSH with free T4 reflex; Future  - UA Macroscopic with reflex to Microscopic and Culture; Future    5. Recurrent major depressive disorder, in full remission (H)  Continue sertraline    6. Relapsing remitting multiple sclerosis (H)  Management per Dr. Ross Cabello, deepthi    7. Benign prostatic hyperplasia with urinary frequency  We will try some Flomax to see if this helps.  There may be some underlying issues with his MS that might need further evaluation  - tamsulosin (FLOMAX) 0.4 MG capsule; Take 1 capsule (0.4 mg) by mouth daily  Dispense: 90 capsule; Refill: 4    8. Personal history of tobacco use  - Prof fee: Shared Decision Making for Lung Cancer Screening  - CT Chest Lung Cancer Scrn Low Dose wo; Future    Return in about 1 year (around 4/14/2024) for Routine preventive.     Chief Complaint   Chief Complaint   Patient presents with     Recheck Medication     Physical        Patient Profile   Social History     Social History Narrative    Lives with his wife, Anastasia.  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  Four adult children.        Past Medical History   Patient Active Problem List   Diagnosis     Relapsing remitting multiple sclerosis (H)     Recurrent major depressive disorder, in full remission (H)      Benign prostatic hyperplasia with urinary frequency     Mixed hyperlipidemia       Past Surgical History  He has a past surgical history that includes Laminectomy lumbar one level; Fusion lumbar anterior one level; IR Lumbar Epidural Steroid Injection (9/13/2011); Posterior Laminectomy / Decompression Lumbar Spine; Lumbar Fusion; Arthroscopy shoulder rotator cuff repair (Right); Orif Femur Fracture (Right); and Lumbar Laminectomy (Bilateral, 2/26/2019).     History of Present Illness   This 62 year old man comes in for annual physical.  Overall he is doing well.  I have not seen him for about 4 years because of the pandemic.  His back issue is stable.  MS is stable, reviewed notes with Dr. Ross Cabello.  Mood is stable on sertraline.  He quit smoking about 1 year ago and has been doing well.  He has not had lung cancer screening since 2019.  He is on statin for coronary artery calcification and hyperlipidemia and is tolerating this and has no cardiopulmonary symptoms.  He has been having some urinary frequency and nocturia.    Review of Systems: A comprehensive review of systems was negative except as noted.     Medications and Allergies   Current Outpatient Medications   Medication Sig Dispense Refill     [START ON 6/1/2023] atorvastatin (LIPITOR) 20 MG tablet Take 1 tablet (20 mg) by mouth daily 90 tablet 4     Bioflavonoid Products (VITAMIN C) CHEW        glatiramer acetate 40 MG/ML injection Inject 40 mg Subcutaneous three times a week for 336 days 12 mL 11     modafinil (PROVIGIL) 200 MG tablet Take 1 tablet (200 mg) by mouth 2 times daily 60 tablet 5     MULTIPLE VITAMIN PO Take 1 tablet by mouth       sertraline (ZOLOFT) 50 MG tablet Take 1 tablet (50 mg) by mouth daily 90 tablet 3     tamsulosin (FLOMAX) 0.4 MG capsule Take 1 capsule (0.4 mg) by mouth daily 90 capsule 4     Vitamin D3 (CHOLECALCIFEROL) 125 MCG (5000 UT) tablet Take 5,000 Units by mouth       zinc gluconate 50 MG tablet Take 50 mg by mouth  "daily       No Known Allergies     Family and Social History   Family History   Problem Relation Age of Onset     Heart Failure Mother      Coronary Artery Disease Father      Hypertension Father      Hyperlipidemia Father      No Known Problems Sister      No Known Problems Sister      No Known Problems Brother      No Known Problems Brother      No Known Problems Daughter      No Known Problems Son      No Known Problems Son      No Known Problems Son         Social History     Tobacco Use     Smoking status: Former     Smokeless tobacco: Never   Substance Use Topics     Alcohol use: Yes     Drug use: Never        Physical Exam   General Appearance:   No acute distress    /76 (BP Location: Left arm, Patient Position: Sitting, Cuff Size: Adult Large)   Pulse 74   Temp 97.9  F (36.6  C) (Tympanic)   Resp 12   Ht 1.886 m (6' 2.25\")   Wt 93 kg (205 lb)   SpO2 97%   BMI 26.14 kg/m      EYES: Eyelids, conjunctiva, and sclera were normal. Pupils were normal. Cornea, iris, and lens were normal bilaterally.  HEAD, EARS, NOSE, MOUTH, AND THROAT: Head and face were normal. Hearing was normal to voice and the ears were normal to external exam.  NECK: Neck appearance was normal. There were no neck masses and the thyroid was not enlarged.  RESPIRATORY: Breathing pattern was normal and the chest moved symmetrically.  Percussion/auscultatory percussion was normal.  Lung sounds were normal and there were no abnormal sounds.  CARDIOVASCULAR: Heart rate and rhythm were normal.  S1 and S2 were normal and there were no extra sounds or murmurs. Peripheral pulses in arms and legs were normal.  Jugular venous pressure was normal.  There was no peripheral edema.  GASTROINTESTINAL: The abdomen was normal in contour.  Bowel sounds were present.  Percussion detected no organ enlargement or tenderness.  Palpation detected no tenderness, mass, or enlarged organs.   MUSCULOSKELETAL: Skeletal configuration was normal and muscle mass " was normal for age. Joint appearance was overall normal.  LYMPHATIC: There were no enlarged nodes.  SKIN/HAIR/NAILS: Skin color was normal.  There were no skin lesions.  Hair and nails were normal.  NEUROLOGIC: The patient was alert and oriented to person, place, time, and circumstance. Speech was normal. Cranial nerves were normal. Motor strength was normal for age. The patient was normally coordinated.  PSYCHIATRIC:  Mood and affect were normal and the patient had normal recent and remote memory. The patient's judgment and insight were normal.       Additional Information        Cam Hill MD  Internal Medicine  Contact me at [phone]  Answers for HPI/ROS submitted by the patient on 4/9/2023  Getting at least 3 servings of Calcium per day:: NO  Diet:: Other  Taking medications regularly:: Yes  Medication side effects:: None  Bi-annual eye exam:: Yes  Dental care twice a year:: Yes  Sleep apnea or symptoms of sleep apnea:: None  abdominal pain: No  Blood in stool: No  Blood in urine: No  chest pain: No  chills: No  congestion: No  constipation: Yes  cough: No  diarrhea: No  dizziness: No  ear pain: No  eye pain: No  nervous/anxious: No  fever: No  frequency: Yes  genital sores: No  headaches: No  hearing loss: No  heartburn: No  arthralgias: No  joint swelling: No  peripheral edema: No  mood changes: No  myalgias: No  nausea: No  dysuria: No  palpitations: No  Skin sensation changes: No  sore throat: No  urgency: No  rash: No  shortness of breath: No  visual disturbance: No  weakness: No  impotence: No  penile discharge: No  Additional concerns today:: No  Duration of exercise:: Less than 15 minutes

## 2023-04-14 NOTE — PATIENT INSTRUCTIONS
Lung Cancer Screening   Frequently Asked Questions  If you are at high-risk for lung cancer, getting screened with low-dose computed tomography (LDCT) every year can help save your life. This handout offers answers to some of the most common questions about lung cancer screening. If you have other questions, please call [phone] ([phone]).     What is it?  Lung cancer screening uses special X-ray technology to create an image of your lung tissue. The exam is quick and easy and takes less than 10 seconds. We don t give you any medicine or use any needles. You can eat before and after the exam. You don t need to change your clothes as long as the clothing on your chest doesn t contain metal. But, you do need to be able to hold your breath for at least 6 seconds during the exam.    What is the goal of lung cancer screening?  The goal of lung cancer screening is to save lives. Many times, lung cancer is not found until a person starts having physical symptoms. Lung cancer screening can help detect lung cancer in the earliest stages when it may be easier to treat.    Who should be screened for lung cancer?  We suggest lung cancer screening for anyone who is at high-risk for lung cancer. You are in the high-risk group if you:      are between the ages of 55 and 79, and    have smoked at least 1 pack of cigarettes a day for 20 or more years, and    still smoke or have quit within the past 15 years.    However, if you have a new cough or shortness of breath, you should talk to your doctor before being screened.    Why does it matter if I have symptoms?  Certain symptoms can be a sign that you have a condition in your lungs that should be checked and treated by your doctor. These symptoms include fever, chest pain, a new or changing cough, shortness of breath that you have never felt before, coughing up blood or unexplained weight loss. Having any of these symptoms can greatly affect the results of lung  cancer screening.       Should all smokers get an LDCT lung cancer screening exam?  It depends. Lung cancer screening is for a very specific group of men and women who have a history of heavy smoking over a long period of time (see  Who should be screened for lung cancer  above).  I am in the high-risk group, but have been diagnosed with cancer in the past. Is LDCT lung cancer screening right for me?  In some cases, you should not have LDCT lung screening, such as when your doctor is already following your cancer with CT scan studies. Your doctor will help you decide if LDCT lung screening is right for you.  Do I need to have a screening exam every year?  Yes. If you are in the high-risk group described earlier, you should get an LDCT lung cancer screening exam every year until you are 79, or are no longer willing or able to undergo screening and possible procedures to diagnose and treat lung cancer.  How effective is LDCT at preventing death from lung cancer?  Studies have shown that LDCT lung cancer screening can lower the risk of death from lung cancer by 20 percent in people who are at high-risk.  What are the risks?  There are some risks and limitations of LDCT lung cancer screening. We want to make sure you understand the risks and benefits, so please let us know if you have any questions. Your doctor may want to talk with you more about these risks.    Radiation exposure: As with any exam that uses radiation, there is a very small increased risk of cancer. The amount of radiation in LDCT is small--about the same amount a person would get from a mammogram. Your doctor orders the exam when he or she feels the potential benefits outweigh the risks.    False negatives: No test is perfect, including LDCT. It is possible that you may have a medical condition, including lung cancer, that is not found during your exam. This is called a false negative result.    False positives and more testing: LDCT very often finds  something in the lung that could be cancer, but in fact is not. This is called a false positive result. False positive tests often cause anxiety. To make sure these findings are not cancer, you may need to have more tests. These tests will be done only if you give us permission. Sometimes patients need a treatment that can have side effects, such as a biopsy. For more information on false positives, see  What can I expect from the results?     Findings not related to lung cancer: Your LDCT exam also takes pictures of areas of your body next to your lungs. In a very small number of cases, the CT scan will show an abnormal finding in one of these areas, such as your kidneys, adrenal glands, liver or thyroid. This finding may not be serious, but you may need more tests. Your doctor can help you decide what other tests you may need, if any.  What can I expect from the results?  About 1 out of 4 LDCT exams will find something that may need more tests. Most of the time, these findings are lung nodules. Lung nodules are very small collections of tissue in the lung. These nodules are very common, and the vast majority--more than 97 percent--are not cancer (benign). Most are normal lymph nodes or small areas of scarring from past infections.  But, if a small lung nodule is found to be cancer, the cancer can be cured more than 90 percent of the time. To know if the nodule is cancer, we may need to get more images before your next yearly screening exam. If the nodule has suspicious features (for example, it is large, has an odd shape or grows over time), we will refer you to a specialist for further testing.  Will my doctor also get the results?  Yes. Your doctor will get a copy of your results.  Is it okay to keep smoking now that there s a cancer screening exam?  No. Tobacco is one of the strongest cancer-causing agents. It causes not only lung cancer, but other cancers and cardiovascular (heart) diseases as well. The damage  caused by smoking builds over time. This means that the longer you smoke, the higher your risk of disease. While it is never too late to quit, the sooner you quit, the better.  Where can I find help to quit smoking?  The best way to prevent lung cancer is to stop smoking. If you have already quit smoking, congratulations and keep it up! For help on quitting smoking, please call [place] at 0-729-QUITNOW ([phone]) or the [employer] at 1-557.628.7942 to find local resources near you.  One-on-one health coaching:  If you d prefer to work individually with a health care provider on tobacco cessation, we offer:      Medication Therapy Management:  Our specially trained pharmacists work closely with you and your doctor to help you quit smoking.  Call [phone] or [phone] (toll free).

## 2023-05-03 ENCOUNTER — HOSPITAL ENCOUNTER (OUTPATIENT)
Dept: CT IMAGING | Facility: CLINIC | Age: 63
Discharge: HOME | End: 2023-05-03
Attending: INTERNAL MEDICINE | Admitting: INTERNAL MEDICINE
Payer: COMMERCIAL

## 2023-05-03 DIAGNOSIS — Z87.891 PERSONAL HISTORY OF TOBACCO USE: ICD-10-CM

## 2023-05-03 PROCEDURE — 71271 CT THORAX LUNG CANCER SCR C-: CPT

## 2023-07-06 DIAGNOSIS — G35 RELAPSING REMITTING MULTIPLE SCLEROSIS (H): ICD-10-CM

## 2023-07-06 RX ORDER — MODAFINIL 200 MG/1
200 TABLET ORAL 2 TIMES DAILY
Qty: 60 TABLET | Refills: 5 | Status: SHIPPED | OUTPATIENT
Start: 2023-07-06 | End: 2024-01-08

## 2023-07-06 NOTE — TELEPHONE ENCOUNTER
Refill request for: modafinil (PROVIGIL) 200 MG tablet  Directions: Take 1 tablet (200 mg) by mouth 2 times daily     LOV: 1/2/23  NOV: 1/2/24    30 day supply with 5 refills Medication T'd for review and signature  Dorie Lopez CMA on 7/6/2023 at 9:34 AM

## 2023-08-24 ENCOUNTER — PHARMACY VISIT (OUTPATIENT)
Dept: ADMINISTRATIVE | Facility: CLINIC | Age: 63
End: 2023-08-24
Payer: COMMERCIAL

## 2023-08-24 NOTE — PROGRESS NOTES
Multiple Sclerosis Clinical Follow Up Assessment   Completed on 2023/08/24 20:38:59 [facility], by Dionna Malin        Activity Date 2023/08/24     Activity Medications    Glatiramer acetate        Care Details    What are the patient's goals for this therapy?   ? 07/27/22: no changes in MS      Select topic(s) to document a follow-up evaluation and updates:   ? Start of Therapy Confirmation   ? Clinical Evaluation & Updates    Have you had a relapse, episode, or attack of MS lasting at least 24 hours since the last time we talked?   ? No   ? Activities of Daily Living Assessment        Please enter patient's PDC. [QA Metric]   ? 1      Are you missing doses?   ? No      Select to review any CURRENT side effects the patient is experiencing for MS drug therapy:   ? None          Summary Notes  I had the pleasure of speaking to pt for TM.   MS: Pt states he is stable. Has some tingling in his feet- it comes and goes when it is hot and humid. No flares/relapses. Pt has annual neurologist appt- will see them next Jan. States he does his MRI every 2 years so anticipates he will have another one at that time as well. No new meds, allergies or health concerns.   GA: No side effects. Doses: no missed/delay doses. States he has a good schedule. PDC: 1.0 Questions: none   Plan: Biannual TM outreach and pt was invited to call sooner GAYLA MALIN, PharmD, CSP  Therapy Management Pharmacist  [address] [address]   [email or web address]  [email or web address]   Specialty: [phone]  Mail Order: [phone]

## 2023-11-30 DIAGNOSIS — G35 RELAPSING REMITTING MULTIPLE SCLEROSIS (H): ICD-10-CM

## 2023-11-30 RX ORDER — GLATIRAMER 40 MG/ML
40 INJECTION, SOLUTION SUBCUTANEOUS
Qty: 12 ML | Refills: 0 | Status: SHIPPED | OUTPATIENT
Start: 2023-12-01 | End: 2023-12-28

## 2023-11-30 NOTE — TELEPHONE ENCOUNTER
Refill request for: glatiramer acetate 40 MG/ML injection    Directions: Inject 40 mg Subcutaneous three times a week     LOV: 1/2/23  NOV: 1/8/24    Sending to Dr. Hoover as Dr. Cabello is out of office     30 day supply with 0 refills Medication T'd for review and signature  Dorie Lopez CMA on 11/30/2023 at 11:05 AM  [facility]

## 2023-12-28 DIAGNOSIS — G35 RELAPSING REMITTING MULTIPLE SCLEROSIS (H): ICD-10-CM

## 2023-12-28 RX ORDER — GLATIRAMER 40 MG/ML
40 INJECTION, SOLUTION SUBCUTANEOUS
Qty: 12 ML | Refills: 0 | Status: SHIPPED | OUTPATIENT
Start: 2023-12-29 | End: 2024-01-08

## 2023-12-28 NOTE — TELEPHONE ENCOUNTER
Refill request for: glatiramer 40mg/ml   Directions: Inject 40 mg Subcutaneous three times a week - Subcutaneous     LOV: 01/02/23  NOV: Due for follow up 01/2024    30 day supply with 0 refills Medication T'd for review and signature    Antonia Miller LPN on 12/28/2023 at 3:38 PM

## 2024-01-06 NOTE — PROGRESS NOTES
NEUROLOGY FOLLOW UP VISIT  NOTE       [facility] NEUROLOGY [place]  [address] [place] [address]  Tel: [phone]  Fax: [phone]  [email or web address]     Olegario Grady,  1960, MRN [id number]  PCP: [place]  Date: 2024      ASSESSMENT & PLAN     Visit Diagnosis  Relapsing remitting multiple sclerosis (H)     Relapsing remitting multiple sclerosis  Crow 63-year-old male with depression, relapsing remitting multiple sclerosis who returns for yearly follow-up.  His condition is fairly stable on current regimen and I have recommended:    1.  Repeat MRI brain, cervical and thoracic spine with and without contrast.  Most recent imaging studies were in   2.  Continue Copaxone 40 mg subcu 3 times per week, Provigil 200 mg 2 times daily and Zoloft 50 mg daily  3.  Continue vitamin D supplements and check vitamin D level  4.  Follow-up in 1 year    Thank you again for this referral, please feel free to contact me if you have any questions.    Ross Cabello MD  [facility]  (Formerly, [facility]     HISTORY OF PRESENT ILLNESS     Patient is a crow 63-year-old male with history of depression, relapsing remitting multiple sclerosis last seen on 2023 who returns for yearly follow-up.  During his last visit he was continued on Copaxone, Provigil, Zoloft and vitamin D and reports no significant change.  He has not experienced any focal weakness numbness or tingling.  He feels as long as he is taking Provigil his fatigue is under good control    Previously he had developed some lower back pain and had MRI that showed severe L3-L4 stenosis and had decompressive surgery done.  Since his last visit he reports no significant change in his symptoms.  Occasionally he has some balance issues      PROBLEM LIST   Patient Active Problem List   Diagnosis Code    Relapsing remitting multiple sclerosis (H) G35     Recurrent major depressive disorder, in full remission (H24) F33.42    Benign prostatic hyperplasia with urinary frequency N40.1, R35.0    Mixed hyperlipidemia E78.2         PAST MEDICAL & SURGICAL HISTORY     Past Medical History:   Patient  has no past medical history on file.    Surgical History:  He  has a past surgical history that includes Laminectomy lumbar one level; Fusion lumbar anterior one level; IR Lumbar Epidural Steroid Injection (9/13/2011); Posterior Laminectomy / Decompression Lumbar Spine; Lumbar Fusion; Arthroscopy shoulder rotator cuff repair (Right); Orif Femur Fracture (Right); and Lumbar Laminectomy (Bilateral, 2/26/2019).     SOCIAL HISTORY     Reviewed, and he  reports that he has quit smoking. He has never used smokeless tobacco. He reports current alcohol use. He reports that he does not use drugs.     FAMILY HISTORY     Reviewed, and family history includes Coronary Artery Disease in his father; Heart Failure in his mother; Hyperlipidemia in his father; Hypertension in his father; No Known Problems in his brother, brother, daughter, sister, sister, son, son, and son.     ALLERGIES     No Known Allergies      REVIEW OF SYSTEMS     A 12 point review of system was performed and was negative except as outlined in the history of present illness.     HOME MEDICATIONS     Current Outpatient Rx   Medication Sig Dispense Refill    atorvastatin (LIPITOR) 20 MG tablet Take 1 tablet (20 mg) by mouth daily 90 tablet 4    Bioflavonoid Products (VITAMIN C) CHEW       glatiramer acetate 40 MG/ML injection Inject 40 mg Subcutaneous three times a week 12 mL 11    modafinil (PROVIGIL) 200 MG tablet Take 1 tablet (200 mg) by mouth 2 times daily 60 tablet 5    MULTIPLE VITAMIN PO Take 1 tablet by mouth      sertraline (ZOLOFT) 50 MG tablet Take 1 tablet (50 mg) by mouth daily 90 tablet 3    tamsulosin (FLOMAX) 0.4 MG capsule Take 1 capsule (0.4 mg) by mouth daily 90 capsule 4    Vitamin D3 (CHOLECALCIFEROL)  "125 MCG (5000 UT) tablet Take 5,000 Units by mouth      zinc gluconate 50 MG tablet Take 50 mg by mouth daily           PHYSICAL EXAM     Vital signs  /79 (BP Location: Right arm, Patient Position: Sitting)   Pulse 81   Ht 1.88 m (6' 2\")   Wt 93.4 kg (206 lb)   BMI 26.45 kg/m      Weight:   206 lbs 0 oz    Patient is alert and oriented x4 in no acute distress. Vital signs were reviewed and are documented in electronic medical record. Neck was supple, no carotid bruits, thyromegaly, JVD, or lymphadenopathy was noted.   NEUROLOGY EXAM:   Patient s speech was normal with no aphasia or dysarthria. Mentation, and affect were also normal.    Funduscopic exam was normal, with normal cup to disc ratio. Cranial nerves II -XII were intact.    Patient had normal mass, tone and motor strength was 5/5 in all extremities without pronator drift.    Sensation was intact to light touch, pinprick, and vibratory sensation.    Reflexes were 2+ symmetrical with downgoing toes.    No dysmetria noted on FNF or HKS. Romberg was negative.   Gait testing was normal. Able to walk on toes/heels     PERTINENT DIAGNOSTIC STUDIES     Following studies were reviewed:     MRI BRAIN, CERVICAL AND THORACIC SPINE 7/12/2021  MRI BRAIN:  1.  Small nonspecific foci of nonenhancing T2 hyperintensities scattered within the supratentorial brain parenchyma are unchanged versus 2018.  2.  No enhancing plaques or other findings to suggest active demyelination.  3.  No acute intracranial process.     MRI CERVICAL SPINE:  1.  Possible subtle focus of chronic demyelination within the right lateral cervical spinal cord at C5-C6 level unchanged versus prior.  2.  No definite new spinal cord signal abnormality to indicate interval demyelination since 2018.  3.  No pathologic enhancement to indicate active demyelination within the cervical spinal cord.  4.  Degenerative changes without central spinal canal stenosis. Edema associated with right-sided C2-C3 " facet arthropathy. Up to mild to moderate neural foraminal narrowing on the right at C3-C4. Mild foraminal narrowing elsewhere.     MRI THORACIC SPINE:  1.  Unchanged chronic demyelinating plaques within the thoracic spinal cord.  2.  No new lesion to indicate interval demyelination since 2018. No pathologic enhancement to indicate active demyelination within the thoracic spinal cord.     LUMBAR  SPINE 2/1/19  1. Canal decompression and posterior instrumented fusion L4-L5. Canal is   well decompressed dorsally without canal stenosis. Neural foramina are   partially obscured without findings for high-grade foraminal stenosis.    2. Although the neural foramina are significantly obscured by   susceptibility artifact at L5-S1, there appears to be severe right and   moderate left neural foraminal stenosis at this level.     3. Severe spinal canal stenosis L3-L4 with left greater than right   lateral recess stenosis. Severe left and more moderate right neural   foraminal stenosis.     4. At L2-L3, there is moderate spinal canal stenosis without significant   foraminal stenosis.    MRI BRAIN: 8/30/18  1. A few small nonspecific white matter hyperintensities are new versus   the prior exam but do not enhance compatible with areas of now chronic   demyelination that have occurred in the interval. No pathologic   enhancement to indicate active demyelination   in the brain.  2. No superimposed acute intracranial finding.    MRI CERVICAL SPINE 8/30/2018  1. Small focus of nonenhancing T2 hyperintensity in the right spinal cord   at the C5-C6 level compatible with focus of chronic demyelination is   unchanged from prior. No new spinal cord signal abnormality. No pathologic   enhancement.    MRI THORACIC SPINE 8/30/2018  1. Multiple patchy areas of nonenhancing T2 signal hyperintensity within   the thoracic spinal cord are not definitely changed versus prior.  2. No pathologic enhancement to indicate active demyelination in the    thoracic spinal cord.        PERTINENT LABS  Following labs were reviewed:  No visits with results within 3 Month(s) from this visit.   Latest known visit with results is:   Office Visit on 04/14/2023   Component Date Value Ref Range Status    WBC Count 04/14/2023 5.7  4.0 - 11.0 10e3/uL Final    RBC Count 04/14/2023 5.06  4.40 - 5.90 10e6/uL Final    Hemoglobin 04/14/2023 14.7  13.3 - 17.7 g/dL Final    Hematocrit 04/14/2023 45.3  40.0 - 53.0 % Final    MCV 04/14/2023 90  78 - 100 fL Final    MCH 04/14/2023 29.1  26.5 - 33.0 pg Final    MCHC 04/14/2023 32.5  31.5 - 36.5 g/dL Final    RDW 04/14/2023 12.8  10.0 - 15.0 % Final    Platelet Count 04/14/2023 202  150 - 450 10e3/uL Final    Sodium 04/14/2023 143  136 - 145 mmol/L Final    Potassium 04/14/2023 4.3  3.4 - 5.3 mmol/L Final    Chloride 04/14/2023 106  98 - 107 mmol/L Final    Carbon Dioxide (CO2) 04/14/2023 24  22 - 29 mmol/L Final    Anion Gap 04/14/2023 13  7 - 15 mmol/L Final    Urea Nitrogen 04/14/2023 13.8  8.0 - 23.0 mg/dL Final    Creatinine 04/14/2023 0.94  0.67 - 1.17 mg/dL Final    Calcium 04/14/2023 9.7  8.8 - 10.2 mg/dL Final    Glucose 04/14/2023 95  70 - 99 mg/dL Final    Alkaline Phosphatase 04/14/2023 92  40 - 129 U/L Final    AST 04/14/2023 35  10 - 50 U/L Final    ALT 04/14/2023 46  10 - 50 U/L Final    Protein Total 04/14/2023 6.9  6.4 - 8.3 g/dL Final    Albumin 04/14/2023 4.6  3.5 - 5.2 g/dL Final    Bilirubin Total 04/14/2023 0.8  <=1.2 mg/dL Final    GFR Estimate 04/14/2023 >90  >60 mL/min/1.73m2 Final    Cholesterol 04/14/2023 159  <200 mg/dL Final    Triglycerides 04/14/2023 86  <150 mg/dL Final    Direct Measure HDL 04/14/2023 52  >=40 mg/dL Final    LDL Cholesterol Calculated 04/14/2023 90  <=100 mg/dL Final    Non HDL Cholesterol 04/14/2023 107  <130 mg/dL Final    Prostate Specific Antigen Screen 04/14/2023 1.07  0.00 - 4.50 ng/mL Final    TSH 04/14/2023 1.33  0.30 - 4.20 uIU/mL Final    Color Urine 04/14/2023 Yellow  Colorless,  Straw, Light Yellow, Yellow Final    Appearance Urine 04/14/2023 Clear  Clear Final    Glucose Urine 04/14/2023 Negative  Negative mg/dL Final    Bilirubin Urine 04/14/2023 Negative  Negative Final    Ketones Urine 04/14/2023 Negative  Negative mg/dL Final    Specific Gravity Urine 04/14/2023 >=1.030  1.005 - 1.030 Final    Blood Urine 04/14/2023 Negative  Negative Final    pH Urine 04/14/2023 5.5  5.0 - 8.0 Final    Protein Albumin Urine 04/14/2023 Negative  Negative mg/dL Final    Urobilinogen Urine 04/14/2023 0.2  0.2, 1.0 E.U./dL Final    Nitrite Urine 04/14/2023 Negative  Negative Final    Leukocyte Esterase Urine 04/14/2023 Negative  Negative Final         Total time spent for face to face visit, reviewing labs/imaging studies, counseling and coordination of care was: 30 Minutes spent on the date of the encounter doing chart review, review of outside records, review of test results, interpretation of tests, patient visit, and documentation     This note was dictated using voice recognition software.  Any grammatical or context distortions are unintentional and inherent to the software.    Orders Placed This Encounter   Procedures    MR Brain w/o & w Contrast    MR Cervical Spine w/o & w Contrast    MR Thoracic Spine w/o & w Contrast    Vitamin D Deficiency      New Prescriptions    No medications on file     Modified Medications    Modified Medication Previous Medication    GLATIRAMER ACETATE 40 MG/ML INJECTION glatiramer acetate 40 MG/ML injection       Inject 40 mg Subcutaneous three times a week    Inject 40 mg Subcutaneous three times a week DUE FOR APPT WITH DR. HERNANDEZ    MODAFINIL (PROVIGIL) 200 MG TABLET modafinil (PROVIGIL) 200 MG tablet       Take 1 tablet (200 mg) by mouth 2 times daily    Take 1 tablet (200 mg) by mouth 2 times daily    SERTRALINE (ZOLOFT) 50 MG TABLET sertraline (ZOLOFT) 50 MG tablet       Take 1 tablet (50 mg) by mouth daily    Take 1 tablet (50 mg) by mouth daily

## 2024-01-08 ENCOUNTER — LAB (OUTPATIENT)
Dept: LAB | Facility: HOSPITAL | Age: 64
End: 2024-01-08
Payer: COMMERCIAL

## 2024-01-08 ENCOUNTER — OFFICE VISIT (OUTPATIENT)
Dept: NEUROLOGY | Facility: CLINIC | Age: 64
End: 2024-01-08
Payer: COMMERCIAL

## 2024-01-08 VITALS
WEIGHT: 206 LBS | SYSTOLIC BLOOD PRESSURE: 135 MMHG | HEART RATE: 81 BPM | HEIGHT: 74 IN | BODY MASS INDEX: 26.44 KG/M2 | DIASTOLIC BLOOD PRESSURE: 79 MMHG

## 2024-01-08 DIAGNOSIS — G35 RELAPSING REMITTING MULTIPLE SCLEROSIS (H): Primary | ICD-10-CM

## 2024-01-08 DIAGNOSIS — G35 RELAPSING REMITTING MULTIPLE SCLEROSIS (H): ICD-10-CM

## 2024-01-08 LAB — VIT D+METAB SERPL-MCNC: 49 NG/ML (ref 20–50)

## 2024-01-08 PROCEDURE — 36415 COLL VENOUS BLD VENIPUNCTURE: CPT

## 2024-01-08 PROCEDURE — 82306 VITAMIN D 25 HYDROXY: CPT

## 2024-01-08 PROCEDURE — 99214 OFFICE O/P EST MOD 30 MIN: CPT | Performed by: PSYCHIATRY & NEUROLOGY

## 2024-01-08 RX ORDER — GLATIRAMER 40 MG/ML
40 INJECTION, SOLUTION SUBCUTANEOUS
Qty: 12 ML | Refills: 11 | Status: SHIPPED | OUTPATIENT
Start: 2024-01-08

## 2024-01-08 RX ORDER — MODAFINIL 200 MG/1
200 TABLET ORAL 2 TIMES DAILY
Qty: 60 TABLET | Refills: 5 | Status: SHIPPED | OUTPATIENT
Start: 2024-01-08 | End: 2024-02-02

## 2024-01-08 NOTE — NURSING NOTE
Chief Complaint   Patient presents with    Multiple Sclerosis     Annual follow up- no new concerns      Dorie Lopez CMA on 1/8/2024 at 9:07 AM  [facility]

## 2024-01-08 NOTE — LETTER
2024         RE: Olegario Grady  [address] [facility] [place] [address]        Dear Colleague,    Thank you for referring your patient, Olegario Grady, to the [facility] [place]. Please see a copy of my visit note below.    NEUROLOGY FOLLOW UP VISIT  NOTE       [facility] [place]  [address] [place] [address]  Tel: [phone]  Fax: [phone]  [email or web address]     Olegario Grady,  1960, MRN [id number]  PCP: Cam [place]  Date: 2024      ASSESSMENT & PLAN     Visit Diagnosis  Relapsing remitting multiple sclerosis (H)     Relapsing remitting multiple sclerosis  Pleasant 63-year-old male with depression, relapsing remitting multiple sclerosis who returns for yearly follow-up.  His condition is fairly stable on current regimen and I have recommended:    1.  Repeat MRI brain, cervical and thoracic spine with and without contrast.  Most recent imaging studies were in   2.  Continue Copaxone 40 mg subcu 3 times per week, Provigil 200 mg 2 times daily and Zoloft 50 mg daily  3.  Continue vitamin D supplements and check vitamin D level  4.  Follow-up in 1 year    Thank you again for this referral, please feel free to contact me if you have any questions.    Ross Cabello MD  [facility]  (Formerly, [facility]     HISTORY OF PRESENT ILLNESS     Patient is a pleasant 63-year-old male with history of depression, relapsing remitting multiple sclerosis last seen on 2023 who returns for yearly follow-up.  During his last visit he was continued on Copaxone, Provigil, Zoloft and vitamin D and reports no significant change.  He has not experienced any focal weakness numbness or tingling.  He feels as long as he is taking Provigil his fatigue is under good control    Previously he had developed some lower back pain and had MRI that showed severe L3-L4 stenosis and  had decompressive surgery done.  Since his last visit he reports no significant change in his symptoms.  Occasionally he has some balance issues      PROBLEM LIST   Patient Active Problem List   Diagnosis Code     Relapsing remitting multiple sclerosis (H) G35     Recurrent major depressive disorder, in full remission (H24) F33.42     Benign prostatic hyperplasia with urinary frequency N40.1, R35.0     Mixed hyperlipidemia E78.2         PAST MEDICAL & SURGICAL HISTORY     Past Medical History:   Patient  has no past medical history on file.    Surgical History:  He  has a past surgical history that includes Laminectomy lumbar one level; Fusion lumbar anterior one level; IR Lumbar Epidural Steroid Injection (9/13/2011); Posterior Laminectomy / Decompression Lumbar Spine; Lumbar Fusion; Arthroscopy shoulder rotator cuff repair (Right); Orif Femur Fracture (Right); and Lumbar Laminectomy (Bilateral, 2/26/2019).     SOCIAL HISTORY     Reviewed, and he  reports that he has quit smoking. He has never used smokeless tobacco. He reports current alcohol use. He reports that he does not use drugs.     FAMILY HISTORY     Reviewed, and family history includes Coronary Artery Disease in his father; Heart Failure in his mother; Hyperlipidemia in his father; Hypertension in his father; No Known Problems in his brother, brother, daughter, sister, sister, son, son, and son.     ALLERGIES     No Known Allergies      REVIEW OF SYSTEMS     A 12 point review of system was performed and was negative except as outlined in the history of present illness.     HOME MEDICATIONS     Current Outpatient Rx   Medication Sig Dispense Refill     atorvastatin (LIPITOR) 20 MG tablet Take 1 tablet (20 mg) by mouth daily 90 tablet 4     Bioflavonoid Products (VITAMIN C) CHEW        glatiramer acetate 40 MG/ML injection Inject 40 mg Subcutaneous three times a week 12 mL 11     modafinil (PROVIGIL) 200 MG tablet Take 1 tablet (200 mg) by mouth 2 times  "daily 60 tablet 5     MULTIPLE VITAMIN PO Take 1 tablet by mouth       sertraline (ZOLOFT) 50 MG tablet Take 1 tablet (50 mg) by mouth daily 90 tablet 3     tamsulosin (FLOMAX) 0.4 MG capsule Take 1 capsule (0.4 mg) by mouth daily 90 capsule 4     Vitamin D3 (CHOLECALCIFEROL) 125 MCG (5000 UT) tablet Take 5,000 Units by mouth       zinc gluconate 50 MG tablet Take 50 mg by mouth daily           PHYSICAL EXAM     Vital signs  /79 (BP Location: Right arm, Patient Position: Sitting)   Pulse 81   Ht 1.88 m (6' 2\")   Wt 93.4 kg (206 lb)   BMI 26.45 kg/m      Weight:   206 lbs 0 oz    Patient is alert and oriented x4 in no acute distress. Vital signs were reviewed and are documented in electronic medical record. Neck was supple, no carotid bruits, thyromegaly, JVD, or lymphadenopathy was noted.   NEUROLOGY EXAM:    Patient s speech was normal with no aphasia or dysarthria. Mentation, and affect were also normal.     Funduscopic exam was normal, with normal cup to disc ratio. Cranial nerves II -XII were intact.     Patient had normal mass, tone and motor strength was 5/5 in all extremities without pronator drift.     Sensation was intact to light touch, pinprick, and vibratory sensation.     Reflexes were 2+ symmetrical with downgoing toes.     No dysmetria noted on FNF or HKS. Romberg was negative.    Gait testing was normal. Able to walk on toes/heels     PERTINENT DIAGNOSTIC STUDIES     Following studies were reviewed:     MRI BRAIN, CERVICAL AND THORACIC SPINE 7/12/2021  MRI BRAIN:  1.  Small nonspecific foci of nonenhancing T2 hyperintensities scattered within the supratentorial brain parenchyma are unchanged versus 2018.  2.  No enhancing plaques or other findings to suggest active demyelination.  3.  No acute intracranial process.     MRI CERVICAL SPINE:  1.  Possible subtle focus of chronic demyelination within the right lateral cervical spinal cord at C5-C6 level unchanged versus prior.  2.  No definite " new spinal cord signal abnormality to indicate interval demyelination since 2018.  3.  No pathologic enhancement to indicate active demyelination within the cervical spinal cord.  4.  Degenerative changes without central spinal canal stenosis. Edema associated with right-sided C2-C3 facet arthropathy. Up to mild to moderate neural foraminal narrowing on the right at C3-C4. Mild foraminal narrowing elsewhere.     MRI THORACIC SPINE:  1.  Unchanged chronic demyelinating plaques within the thoracic spinal cord.  2.  No new lesion to indicate interval demyelination since 2018. No pathologic enhancement to indicate active demyelination within the thoracic spinal cord.     LUMBAR  SPINE 2/1/19  1. Canal decompression and posterior instrumented fusion L4-L5. Canal is   well decompressed dorsally without canal stenosis. Neural foramina are   partially obscured without findings for high-grade foraminal stenosis.    2. Although the neural foramina are significantly obscured by   susceptibility artifact at L5-S1, there appears to be severe right and   moderate left neural foraminal stenosis at this level.     3. Severe spinal canal stenosis L3-L4 with left greater than right   lateral recess stenosis. Severe left and more moderate right neural   foraminal stenosis.     4. At L2-L3, there is moderate spinal canal stenosis without significant   foraminal stenosis.    MRI BRAIN: 8/30/18  1. A few small nonspecific white matter hyperintensities are new versus   the prior exam but do not enhance compatible with areas of now chronic   demyelination that have occurred in the interval. No pathologic   enhancement to indicate active demyelination   in the brain.  2. No superimposed acute intracranial finding.    MRI CERVICAL SPINE 8/30/2018  1. Small focus of nonenhancing T2 hyperintensity in the right spinal cord   at the C5-C6 level compatible with focus of chronic demyelination is   unchanged from prior. No new spinal cord signal  abnormality. No pathologic   enhancement.    MRI THORACIC SPINE 8/30/2018  1. Multiple patchy areas of nonenhancing T2 signal hyperintensity within   the thoracic spinal cord are not definitely changed versus prior.  2. No pathologic enhancement to indicate active demyelination in the   thoracic spinal cord.        PERTINENT LABS  Following labs were reviewed:  No visits with results within 3 Month(s) from this visit.   Latest known visit with results is:   Office Visit on 04/14/2023   Component Date Value Ref Range Status     WBC Count 04/14/2023 5.7  4.0 - 11.0 10e3/uL Final     RBC Count 04/14/2023 5.06  4.40 - 5.90 10e6/uL Final     Hemoglobin 04/14/2023 14.7  13.3 - 17.7 g/dL Final     Hematocrit 04/14/2023 45.3  40.0 - 53.0 % Final     MCV 04/14/2023 90  78 - 100 fL Final     MCH 04/14/2023 29.1  26.5 - 33.0 pg Final     MCHC 04/14/2023 32.5  31.5 - 36.5 g/dL Final     RDW 04/14/2023 12.8  10.0 - 15.0 % Final     Platelet Count 04/14/2023 202  150 - 450 10e3/uL Final     Sodium 04/14/2023 143  136 - 145 mmol/L Final     Potassium 04/14/2023 4.3  3.4 - 5.3 mmol/L Final     Chloride 04/14/2023 106  98 - 107 mmol/L Final     Carbon Dioxide (CO2) 04/14/2023 24  22 - 29 mmol/L Final     Anion Gap 04/14/2023 13  7 - 15 mmol/L Final     Urea Nitrogen 04/14/2023 13.8  8.0 - 23.0 mg/dL Final     Creatinine 04/14/2023 0.94  0.67 - 1.17 mg/dL Final     Calcium 04/14/2023 9.7  8.8 - 10.2 mg/dL Final     Glucose 04/14/2023 95  70 - 99 mg/dL Final     Alkaline Phosphatase 04/14/2023 92  40 - 129 U/L Final     AST 04/14/2023 35  10 - 50 U/L Final     ALT 04/14/2023 46  10 - 50 U/L Final     Protein Total 04/14/2023 6.9  6.4 - 8.3 g/dL Final     Albumin 04/14/2023 4.6  3.5 - 5.2 g/dL Final     Bilirubin Total 04/14/2023 0.8  <=1.2 mg/dL Final     GFR Estimate 04/14/2023 >90  >60 mL/min/1.73m2 Final     Cholesterol 04/14/2023 159  <200 mg/dL Final     Triglycerides 04/14/2023 86  <150 mg/dL Final     Direct Measure HDL  04/14/2023 52  >=40 mg/dL Final     LDL Cholesterol Calculated 04/14/2023 90  <=100 mg/dL Final     Non HDL Cholesterol 04/14/2023 107  <130 mg/dL Final     Prostate Specific Antigen Screen 04/14/2023 1.07  0.00 - 4.50 ng/mL Final     TSH 04/14/2023 1.33  0.30 - 4.20 uIU/mL Final     Color Urine 04/14/2023 Yellow  Colorless, Straw, Light Yellow, Yellow Final     Appearance Urine 04/14/2023 Clear  Clear Final     Glucose Urine 04/14/2023 Negative  Negative mg/dL Final     Bilirubin Urine 04/14/2023 Negative  Negative Final     Ketones Urine 04/14/2023 Negative  Negative mg/dL Final     Specific Gravity Urine 04/14/2023 >=1.030  1.005 - 1.030 Final     Blood Urine 04/14/2023 Negative  Negative Final     pH Urine 04/14/2023 5.5  5.0 - 8.0 Final     Protein Albumin Urine 04/14/2023 Negative  Negative mg/dL Final     Urobilinogen Urine 04/14/2023 0.2  0.2, 1.0 E.U./dL Final     Nitrite Urine 04/14/2023 Negative  Negative Final     Leukocyte Esterase Urine 04/14/2023 Negative  Negative Final         Total time spent for face to face visit, reviewing labs/imaging studies, counseling and coordination of care was: 30 Minutes spent on the date of the encounter doing chart review, review of outside records, review of test results, interpretation of tests, patient visit, and documentation     This note was dictated using voice recognition software.  Any grammatical or context distortions are unintentional and inherent to the software.    Orders Placed This Encounter   Procedures     MR Brain w/o & w Contrast     MR Cervical Spine w/o & w Contrast     MR Thoracic Spine w/o & w Contrast     Vitamin D Deficiency      New Prescriptions    No medications on file     Modified Medications    Modified Medication Previous Medication    GLATIRAMER ACETATE 40 MG/ML INJECTION glatiramer acetate 40 MG/ML injection       Inject 40 mg Subcutaneous three times a week    Inject 40 mg Subcutaneous three times a week DUE FOR APPT WITH DR. HERNANDEZ     MODAFINIL (PROVIGIL) 200 MG TABLET modafinil (PROVIGIL) 200 MG tablet       Take 1 tablet (200 mg) by mouth 2 times daily    Take 1 tablet (200 mg) by mouth 2 times daily    SERTRALINE (ZOLOFT) 50 MG TABLET sertraline (ZOLOFT) 50 MG tablet       Take 1 tablet (50 mg) by mouth daily    Take 1 tablet (50 mg) by mouth daily                 Again, thank you for allowing me to participate in the care of your patient.        Sincerely,        Ross Cabello MD

## 2024-01-25 DIAGNOSIS — G35 RELAPSING REMITTING MULTIPLE SCLEROSIS (H): ICD-10-CM

## 2024-01-26 NOTE — TELEPHONE ENCOUNTER
DENY - Refills on file.     Refill request for: Modafinil    Directions: Take 1 tablet BID     LOV: 1/8/24  NOV: 1/9/25

## 2024-01-27 RX ORDER — MODAFINIL 200 MG/1
200 TABLET ORAL 2 TIMES DAILY
Qty: 60 TABLET | Refills: 0 | OUTPATIENT
Start: 2024-01-27

## 2024-02-01 DIAGNOSIS — G35 RELAPSING REMITTING MULTIPLE SCLEROSIS (H): ICD-10-CM

## 2024-02-01 NOTE — TELEPHONE ENCOUNTER
Request for modafinil to go to local pharmacy- not mail order  Medication T'd for review and signature  Dorie Lopez CMA on 2/1/2024 at 4:04 PM  [facility]

## 2024-02-02 RX ORDER — MODAFINIL 200 MG/1
200 TABLET ORAL 2 TIMES DAILY
Qty: 60 TABLET | Refills: 5 | Status: SHIPPED | OUTPATIENT
Start: 2024-02-02

## 2024-02-07 ENCOUNTER — HOSPITAL ENCOUNTER (OUTPATIENT)
Dept: MRI IMAGING | Facility: HOSPITAL | Age: 64
Discharge: HOME | End: 2024-02-07
Attending: PSYCHIATRY & NEUROLOGY
Payer: COMMERCIAL

## 2024-02-07 DIAGNOSIS — G35 RELAPSING REMITTING MULTIPLE SCLEROSIS (H): ICD-10-CM

## 2024-02-07 PROCEDURE — 72156 MRI NECK SPINE W/O & W/DYE: CPT

## 2024-02-07 PROCEDURE — 255N000002 HC RX 255 OP 636: Performed by: PSYCHIATRY & NEUROLOGY

## 2024-02-07 PROCEDURE — 70553 MRI BRAIN STEM W/O & W/DYE: CPT

## 2024-02-07 PROCEDURE — A9585 GADOBUTROL INJECTION: HCPCS | Performed by: PSYCHIATRY & NEUROLOGY

## 2024-02-07 PROCEDURE — 72157 MRI CHEST SPINE W/O & W/DYE: CPT

## 2024-02-07 RX ORDER — GADOBUTROL 604.72 MG/ML
0.1 INJECTION INTRAVENOUS ONCE
Status: COMPLETED | OUTPATIENT
Start: 2024-02-07 | End: 2024-02-07

## 2024-02-07 RX ADMIN — GADOBUTROL 9 ML: 604.72 INJECTION INTRAVENOUS at 17:30

## 2024-02-08 NOTE — RESULT ENCOUNTER NOTE
Marine Melvin    MRI brain overall appears unchanged compared to 7/9/2021 and shows changes due to multiple sclerosis.  1 questionable area in the right cerebellum appears somewhat bigger compared to previous examination and may be an artifact or new area of demyelination.  Please continue with the current plan of care and let us know if there are any questions or concerns.    Regards,  Ross Cabello MD

## 2024-02-08 NOTE — RESULT ENCOUNTER NOTE
Marine Melvin  MRI cervical spine shows changes due to multiple sclerosis but overall no significant change compared to previous MRI scan.  Incidentally left rotator cuff muscles show some swelling and if you are having any left shoulder symptoms, we can consider getting MRI of the shoulder and follow-up with orthopedics      Regards,  Ross Cabello MD

## 2024-02-26 ENCOUNTER — TELEPHONE (OUTPATIENT)
Dept: NEUROLOGY | Facility: CLINIC | Age: 64
End: 2024-02-26
Payer: COMMERCIAL

## 2024-02-26 DIAGNOSIS — G35 RELAPSING REMITTING MULTIPLE SCLEROSIS (H): ICD-10-CM

## 2024-02-26 NOTE — TELEPHONE ENCOUNTER
Fatimah sent a refill request for Sertraline 50 mg tab 1 tab every day # 90.  Previously filled at  [facility]ty

## 2024-02-26 NOTE — TELEPHONE ENCOUNTER
LOV: 1/8    NOV: 1/9/25    Per LOV note:      1.  Repeat MRI brain, cervical and thoracic spine with and without contrast.  Most recent imaging studies were in 2021  2.  Continue Copaxone 40 mg subcu 3 times per week, Provigil 200 mg 2 times daily and Zoloft 50 mg daily      Refill:   Signed Prescriptions:                        Disp   Refills    sertraline (ZOLOFT) 50 MG tablet           90 tab*3        Sig: Take 1 tablet (50 mg) by mouth daily  Authorizing Provider: MIKEY HERNANDEZ  Ordering User: TARA MONAHAN RN, BSN  [facility]

## 2024-03-15 ENCOUNTER — PATIENT OUTREACH (OUTPATIENT)
Dept: CARE COORDINATION | Facility: CLINIC | Age: 64
End: 2024-03-15
Payer: COMMERCIAL

## 2024-03-29 ENCOUNTER — PATIENT OUTREACH (OUTPATIENT)
Dept: CARE COORDINATION | Facility: CLINIC | Age: 64
End: 2024-03-29
Payer: COMMERCIAL

## 2024-05-09 DIAGNOSIS — R35.0 BENIGN PROSTATIC HYPERPLASIA WITH URINARY FREQUENCY: ICD-10-CM

## 2024-05-09 DIAGNOSIS — N40.1 BENIGN PROSTATIC HYPERPLASIA WITH URINARY FREQUENCY: ICD-10-CM

## 2024-05-09 RX ORDER — TAMSULOSIN HYDROCHLORIDE 0.4 MG/1
0.4 CAPSULE ORAL DAILY
Qty: 60 CAPSULE | Refills: 0 | Status: SHIPPED | OUTPATIENT
Start: 2024-05-09 | End: 2024-07-10

## 2024-05-26 ENCOUNTER — HEALTH MAINTENANCE LETTER (OUTPATIENT)
Age: 64
End: 2024-05-26

## 2024-07-08 DIAGNOSIS — E78.2 MIXED HYPERLIPIDEMIA: ICD-10-CM

## 2024-07-08 RX ORDER — ATORVASTATIN CALCIUM 20 MG/1
20 TABLET, FILM COATED ORAL DAILY
Qty: 90 TABLET | Refills: 3 | Status: SHIPPED | OUTPATIENT
Start: 2024-07-08

## 2024-07-10 DIAGNOSIS — R35.0 BENIGN PROSTATIC HYPERPLASIA WITH URINARY FREQUENCY: ICD-10-CM

## 2024-07-10 DIAGNOSIS — N40.1 BENIGN PROSTATIC HYPERPLASIA WITH URINARY FREQUENCY: ICD-10-CM

## 2024-07-10 RX ORDER — TAMSULOSIN HYDROCHLORIDE 0.4 MG/1
0.4 CAPSULE ORAL DAILY
Qty: 60 CAPSULE | Refills: 0 | Status: SHIPPED | OUTPATIENT
Start: 2024-07-10 | End: 2024-09-06

## 2024-09-06 ENCOUNTER — MYC MEDICAL ADVICE (OUTPATIENT)
Dept: INTERNAL MEDICINE | Facility: CLINIC | Age: 64
End: 2024-09-06
Payer: COMMERCIAL

## 2024-09-06 DIAGNOSIS — R35.0 BENIGN PROSTATIC HYPERPLASIA WITH URINARY FREQUENCY: ICD-10-CM

## 2024-09-06 DIAGNOSIS — N40.1 BENIGN PROSTATIC HYPERPLASIA WITH URINARY FREQUENCY: ICD-10-CM

## 2024-09-06 RX ORDER — TAMSULOSIN HYDROCHLORIDE 0.4 MG/1
0.4 CAPSULE ORAL DAILY
Qty: 60 CAPSULE | Refills: 0 | Status: SHIPPED | OUTPATIENT
Start: 2024-09-06

## 2024-11-19 DIAGNOSIS — N40.1 BENIGN PROSTATIC HYPERPLASIA WITH URINARY FREQUENCY: ICD-10-CM

## 2024-11-19 DIAGNOSIS — R35.0 BENIGN PROSTATIC HYPERPLASIA WITH URINARY FREQUENCY: ICD-10-CM

## 2024-11-19 RX ORDER — TAMSULOSIN HYDROCHLORIDE 0.4 MG/1
0.4 CAPSULE ORAL DAILY
Qty: 30 CAPSULE | Refills: 0 | Status: SHIPPED | OUTPATIENT
Start: 2024-11-19

## 2024-12-02 DIAGNOSIS — G35 RELAPSING REMITTING MULTIPLE SCLEROSIS (H): ICD-10-CM

## 2024-12-02 RX ORDER — GLATIRAMER 40 MG/ML
40 INJECTION, SOLUTION SUBCUTANEOUS
Qty: 12 ML | Refills: 11 | Status: SHIPPED | OUTPATIENT
Start: 2024-12-02

## 2024-12-14 SDOH — HEALTH STABILITY: PHYSICAL HEALTH: ON AVERAGE, HOW MANY DAYS PER WEEK DO YOU ENGAGE IN MODERATE TO STRENUOUS EXERCISE (LIKE A BRISK WALK)?: 1 DAY

## 2024-12-14 SDOH — HEALTH STABILITY: PHYSICAL HEALTH: ON AVERAGE, HOW MANY MINUTES DO YOU ENGAGE IN EXERCISE AT THIS LEVEL?: 30 MIN

## 2024-12-14 ASSESSMENT — SOCIAL DETERMINANTS OF HEALTH (SDOH): HOW OFTEN DO YOU GET TOGETHER WITH FRIENDS OR RELATIVES?: ONCE A WEEK

## 2024-12-19 ENCOUNTER — OFFICE VISIT (OUTPATIENT)
Dept: INTERNAL MEDICINE | Facility: CLINIC | Age: 64
End: 2024-12-19
Payer: COMMERCIAL

## 2024-12-19 VITALS
HEIGHT: 74 IN | SYSTOLIC BLOOD PRESSURE: 119 MMHG | OXYGEN SATURATION: 98 % | TEMPERATURE: 97.3 F | WEIGHT: 213.4 LBS | DIASTOLIC BLOOD PRESSURE: 79 MMHG | BODY MASS INDEX: 27.39 KG/M2 | HEART RATE: 68 BPM | RESPIRATION RATE: 12 BRPM

## 2024-12-19 DIAGNOSIS — E78.2 MIXED HYPERLIPIDEMIA: ICD-10-CM

## 2024-12-19 DIAGNOSIS — F33.42 RECURRENT MAJOR DEPRESSIVE DISORDER, IN FULL REMISSION (H): ICD-10-CM

## 2024-12-19 DIAGNOSIS — Z87.891 PERSONAL HISTORY OF TOBACCO USE: ICD-10-CM

## 2024-12-19 DIAGNOSIS — N40.1 BENIGN PROSTATIC HYPERPLASIA WITH URINARY FREQUENCY: ICD-10-CM

## 2024-12-19 DIAGNOSIS — Z00.00 ANNUAL PHYSICAL EXAM: Primary | ICD-10-CM

## 2024-12-19 DIAGNOSIS — Z12.5 SCREENING FOR PROSTATE CANCER: ICD-10-CM

## 2024-12-19 DIAGNOSIS — S22.070S COMPRESSION FRACTURE OF T9 VERTEBRA, SEQUELA: ICD-10-CM

## 2024-12-19 DIAGNOSIS — R35.0 BENIGN PROSTATIC HYPERPLASIA WITH URINARY FREQUENCY: ICD-10-CM

## 2024-12-19 DIAGNOSIS — Z13.1 SCREENING FOR DIABETES MELLITUS: Chronic | ICD-10-CM

## 2024-12-19 DIAGNOSIS — G35 RELAPSING REMITTING MULTIPLE SCLEROSIS (H): ICD-10-CM

## 2024-12-19 LAB
ALBUMIN SERPL BCG-MCNC: 4.5 G/DL (ref 3.5–5.2)
ALBUMIN UR-MCNC: NEGATIVE MG/DL
ALP SERPL-CCNC: 95 U/L (ref 40–150)
ALT SERPL W P-5'-P-CCNC: 47 U/L (ref 0–70)
ANION GAP SERPL CALCULATED.3IONS-SCNC: 10 MMOL/L (ref 7–15)
APPEARANCE UR: CLEAR
AST SERPL W P-5'-P-CCNC: 33 U/L (ref 0–45)
BILIRUB SERPL-MCNC: 0.5 MG/DL
BILIRUB UR QL STRIP: NEGATIVE
BUN SERPL-MCNC: 17.8 MG/DL (ref 8–23)
CALCIUM SERPL-MCNC: 9.8 MG/DL (ref 8.8–10.4)
CHLORIDE SERPL-SCNC: 107 MMOL/L (ref 98–107)
CHOLEST SERPL-MCNC: 161 MG/DL
COLOR UR AUTO: YELLOW
CREAT SERPL-MCNC: 0.91 MG/DL (ref 0.67–1.17)
EGFRCR SERPLBLD CKD-EPI 2021: >90 ML/MIN/1.73M2
ERYTHROCYTE [DISTWIDTH] IN BLOOD BY AUTOMATED COUNT: 13.1 % (ref 10–15)
EST. AVERAGE GLUCOSE BLD GHB EST-MCNC: 108 MG/DL
FASTING STATUS PATIENT QL REPORTED: YES
FASTING STATUS PATIENT QL REPORTED: YES
GLUCOSE SERPL-MCNC: 100 MG/DL (ref 70–99)
GLUCOSE UR STRIP-MCNC: NEGATIVE MG/DL
HBA1C MFR BLD: 5.4 % (ref 0–5.6)
HCO3 SERPL-SCNC: 27 MMOL/L (ref 22–29)
HCT VFR BLD AUTO: 46.2 % (ref 40–53)
HDLC SERPL-MCNC: 55 MG/DL
HGB BLD-MCNC: 15.3 G/DL (ref 13.3–17.7)
HGB UR QL STRIP: NEGATIVE
KETONES UR STRIP-MCNC: NEGATIVE MG/DL
LDLC SERPL CALC-MCNC: 91 MG/DL
LEUKOCYTE ESTERASE UR QL STRIP: NEGATIVE
MCH RBC QN AUTO: 29.6 PG (ref 26.5–33)
MCHC RBC AUTO-ENTMCNC: 33.1 G/DL (ref 31.5–36.5)
MCV RBC AUTO: 89 FL (ref 78–100)
NITRATE UR QL: NEGATIVE
NONHDLC SERPL-MCNC: 106 MG/DL
PH UR STRIP: 6 [PH] (ref 5–8)
PLATELET # BLD AUTO: 236 10E3/UL (ref 150–450)
POTASSIUM SERPL-SCNC: 4.4 MMOL/L (ref 3.4–5.3)
PROT SERPL-MCNC: 6.9 G/DL (ref 6.4–8.3)
PSA SERPL DL<=0.01 NG/ML-MCNC: 0.97 NG/ML (ref 0–4.5)
RBC # BLD AUTO: 5.17 10E6/UL (ref 4.4–5.9)
SODIUM SERPL-SCNC: 144 MMOL/L (ref 135–145)
SP GR UR STRIP: 1.02 (ref 1–1.03)
TRIGL SERPL-MCNC: 76 MG/DL
TSH SERPL DL<=0.005 MIU/L-ACNC: 2.3 UIU/ML (ref 0.3–4.2)
UROBILINOGEN UR STRIP-ACNC: 0.2 E.U./DL
WBC # BLD AUTO: 6.5 10E3/UL (ref 4–11)

## 2024-12-19 RX ORDER — TAMSULOSIN HYDROCHLORIDE 0.4 MG/1
0.4 CAPSULE ORAL DAILY
Qty: 90 CAPSULE | Refills: 4 | Status: SHIPPED | OUTPATIENT
Start: 2024-12-19

## 2024-12-19 ASSESSMENT — PAIN SCALES - GENERAL: PAINLEVEL_OUTOF10: NO PAIN (0)

## 2024-12-19 ASSESSMENT — PATIENT HEALTH QUESTIONNAIRE - PHQ9
SUM OF ALL RESPONSES TO PHQ QUESTIONS 1-9: 0
SUM OF ALL RESPONSES TO PHQ QUESTIONS 1-9: 0
10. IF YOU CHECKED OFF ANY PROBLEMS, HOW DIFFICULT HAVE THESE PROBLEMS MADE IT FOR YOU TO DO YOUR WORK, TAKE CARE OF THINGS AT HOME, OR GET ALONG WITH OTHER PEOPLE: NOT DIFFICULT AT ALL

## 2024-12-19 NOTE — PROGRESS NOTES
Lung Cancer Screening Shared Decision Making Visit     Olegario Grady, a 64 year old male, is eligible for lung cancer screening    History   Smoking Status    Former    Types: Cigarettes   Smokeless Tobacco    Never     I have discussed with patient the risks and benefits of screening for lung cancer with low-dose CT.     The risks include:    radiation exposure: one low dose chest CT has as much ionizing radiation as about 15 chest x-rays, or 6 months of background radiation living in [place]      false positives: most findings/nodules are NOT cancer, but some might still require additional diagnostic evaluation, including biopsy    over-diagnosis: some slow growing cancers that might never have been clinically significant will be detected and treated unnecessarily     The benefit of early detection of lung cancer is contingent upon adherence to annual screening or more frequent follow up if indicated.     Furthermore, to benefit from screening, Olegario must be willing and able to undergo diagnostic procedures, if indicated. Although no specific guide is available for determining severity of comorbidities, it is reasonable to withhold screening in patients who have greater mortality risk from other diseases.     We did discuss that the best way to prevent lung cancer is to not smoke.    Some patients may value a numeric estimation of lung cancer risk when evaluating if lung cancer screening is right for them, here is one calculator:    ShouldIScreen  Answers submitted by the patient for this visit:  Patient Health Questionnaire (Submitted on 12/19/2024)  If you checked off any problems, how difficult have these problems made it for you to do your work, take care of things at home, or get along with other people?: Not difficult at all  PHQ9 TOTAL SCORE: 0

## 2024-12-19 NOTE — PATIENT INSTRUCTIONS
Lung Cancer Screening   Frequently Asked Questions  If you are at high-risk for lung cancer, getting screened with low-dose computed tomography (LDCT) every year can help save your life. This handout offers answers to some of the most common questions about lung cancer screening. If you have other questions, please call [phone] ([phone]).     What is it?  Lung cancer screening uses special X-ray technology to create an image of your lung tissue. The exam is quick and easy and takes less than 10 seconds. We don t give you any medicine or use any needles. You can eat before and after the exam. You don t need to change your clothes as long as the clothing on your chest doesn t contain metal. But, you do need to be able to hold your breath for at least 6 seconds during the exam.    What is the goal of lung cancer screening?  The goal of lung cancer screening is to save lives. Many times, lung cancer is not found until a person starts having physical symptoms. Lung cancer screening can help detect lung cancer in the earliest stages when it may be easier to treat.    Who should be screened for lung cancer?  We suggest lung cancer screening for anyone who is at high-risk for lung cancer. You are in the high-risk group if you:      are between the ages of 55 and 79, and    have smoked at least 1 pack of cigarettes a day for 20 or more years, and    still smoke or have quit within the past 15 years.    However, if you have a new cough or shortness of breath, you should talk to your doctor before being screened.    Why does it matter if I have symptoms?  Certain symptoms can be a sign that you have a condition in your lungs that should be checked and treated by your doctor. These symptoms include fever, chest pain, a new or changing cough, shortness of breath that you have never felt before, coughing up blood or unexplained weight loss. Having any of these symptoms can greatly affect the results of lung  cancer screening.       Should all smokers get an LDCT lung cancer screening exam?  It depends. Lung cancer screening is for a very specific group of men and women who have a history of heavy smoking over a long period of time (see  Who should be screened for lung cancer  above).  I am in the high-risk group, but have been diagnosed with cancer in the past. Is LDCT lung cancer screening right for me?  In some cases, you should not have LDCT lung screening, such as when your doctor is already following your cancer with CT scan studies. Your doctor will help you decide if LDCT lung screening is right for you.  Do I need to have a screening exam every year?  Yes. If you are in the high-risk group described earlier, you should get an LDCT lung cancer screening exam every year until you are 79, or are no longer willing or able to undergo screening and possible procedures to diagnose and treat lung cancer.  How effective is LDCT at preventing death from lung cancer?  Studies have shown that LDCT lung cancer screening can lower the risk of death from lung cancer by 20 percent in people who are at high-risk.  What are the risks?  There are some risks and limitations of LDCT lung cancer screening. We want to make sure you understand the risks and benefits, so please let us know if you have any questions. Your doctor may want to talk with you more about these risks.    Radiation exposure: As with any exam that uses radiation, there is a very small increased risk of cancer. The amount of radiation in LDCT is small--about the same amount a person would get from a mammogram. Your doctor orders the exam when he or she feels the potential benefits outweigh the risks.    False negatives: No test is perfect, including LDCT. It is possible that you may have a medical condition, including lung cancer, that is not found during your exam. This is called a false negative result.    False positives and more testing: LDCT very often finds  something in the lung that could be cancer, but in fact is not. This is called a false positive result. False positive tests often cause anxiety. To make sure these findings are not cancer, you may need to have more tests. These tests will be done only if you give us permission. Sometimes patients need a treatment that can have side effects, such as a biopsy. For more information on false positives, see  What can I expect from the results?     Findings not related to lung cancer: Your LDCT exam also takes pictures of areas of your body next to your lungs. In a very small number of cases, the CT scan will show an abnormal finding in one of these areas, such as your kidneys, adrenal glands, liver or thyroid. This finding may not be serious, but you may need more tests. Your doctor can help you decide what other tests you may need, if any.  What can I expect from the results?  About 1 out of 4 LDCT exams will find something that may need more tests. Most of the time, these findings are lung nodules. Lung nodules are very small collections of tissue in the lung. These nodules are very common, and the vast majority--more than 97 percent--are not cancer (benign). Most are normal lymph nodes or small areas of scarring from past infections.  But, if a small lung nodule is found to be cancer, the cancer can be cured more than 90 percent of the time. To know if the nodule is cancer, we may need to get more images before your next yearly screening exam. If the nodule has suspicious features (for example, it is large, has an odd shape or grows over time), we will refer you to a specialist for further testing.  Will my doctor also get the results?  Yes. Your doctor will get a copy of your results.  Is it okay to keep smoking now that there s a cancer screening exam?  No. Tobacco is one of the strongest cancer-causing agents. It causes not only lung cancer, but other cancers and cardiovascular (heart) diseases as well. The damage  caused by smoking builds over time. This means that the longer you smoke, the higher your risk of disease. While it is never too late to quit, the sooner you quit, the better.  Where can I find help to quit smoking?  The best way to prevent lung cancer is to stop smoking. If you have already quit smoking, congratulations and keep it up! For help on quitting smoking, please call [facility] at 6-281-QUITNOW ([phone]) or the [employer] at 1-264.588.6801 to find local resources near you.  One-on-one health coaching:  If you d prefer to work individually with a health care provider on tobacco cessation, we offer:      Medication Therapy Management:  Our specially trained pharmacists work closely with you and your doctor to help you quit smoking.  Call [phone] or [phone] (toll free).

## 2024-12-19 NOTE — PROGRESS NOTES
Office Visit - Physical   Olegario Grady   64 year old  male    Date of visit: 12/19/2024  Physician: Cam Hill MD     Assessment and Plan   1. Annual physical exam (Primary)  This is a 64-year-old man with issues as discussed below.  Ongoing healthy lifestyle discussed and recommended    2. Screening for diabetes mellitus  - Hemoglobin A1c; Future  - Hemoglobin A1c    3. Screening for prostate cancer  - Prostate Specific Antigen Screen; Future  - Prostate Specific Antigen Screen    4. Personal history of tobacco use  Quit smoking 2 years ago  - Prof fee: Shared Decision Making for Lung Cancer Screening  - CT Chest Lung Cancer Scrn Low Dose wo; Future    5. Relapsing remitting multiple sclerosis (H)  Continue management per neurology    6. Mixed hyperlipidemia  Continue atorvastatin check labs  - CBC with platelets; Future  - Comprehensive metabolic panel; Future  - Lipid panel reflex to direct LDL Fasting; Future  - TSH with free T4 reflex; Future  - UA Macroscopic with reflex to Microscopic and Culture; Future  - CBC with platelets  - Comprehensive metabolic panel  - Lipid panel reflex to direct LDL Fasting  - TSH with free T4 reflex  - UA Macroscopic with reflex to Microscopic and Culture    7. Recurrent major depressive disorder, in full remission (H)  Continue sertraline, also has modafinil available    8. Benign prostatic hyperplasia with urinary frequency  Stable continue Flomax  - tamsulosin (FLOMAX) 0.4 MG capsule; Take 1 capsule (0.4 mg) by mouth daily.  Dispense: 90 capsule; Refill: 4    9. Compression fracture of T9 vertebra, sequela  - DX Bone Density; Future    Return in about 1 year (around 12/19/2025) for Routine preventive.     Chief Complaint   Chief Complaint   Patient presents with    Physical     Patient reports they are here for annual physical.         Patient Profile   Social History     Social History Narrative    Lives with his wife, Anastasia.  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  Four adult  children. Three grandchildren.        Past Medical History   Patient Active Problem List   Diagnosis    Relapsing remitting multiple sclerosis (H)    Recurrent major depressive disorder, in full remission (H)    Benign prostatic hyperplasia with urinary frequency    Mixed hyperlipidemia       Past Surgical History  He has a past surgical history that includes Laminectomy lumbar one level; Fusion lumbar anterior one level; IR Lumbar Epidural Steroid Injection (9/13/2011); Posterior Laminectomy / Decompression Lumbar Spine; Lumbar Fusion; Arthroscopy shoulder rotator cuff repair (Right); Orif Femur Fracture (Right); and Lumbar Laminectomy (Bilateral, 2/26/2019).     History of Present Illness   This 64 year old man comes in for annual physical.  Overall he is feeling well.  Continues treatment under the guidance of Dr. Ross Cabello for MS which is stable.  At some imaging studies which showed some chronic compression deformities of his thoracic spine.  No known osteoporosis.  Is 2 years from smoking.  Mood stable.    Review of Systems: A comprehensive review of systems was negative except as noted.     Medications and Allergies   Current Outpatient Medications   Medication Sig Dispense Refill    atorvastatin (LIPITOR) 20 MG tablet Take 1 tablet (20 mg) by mouth daily 90 tablet 3    [employer] (VITAMIN C) CHEW       glatiramer acetate 40 MG/ML injection Inject 40 mg subcutaneously three times a week. 12 mL 11    modafinil (PROVIGIL) 200 MG tablet Take 1 tablet (200 mg) by mouth 2 times daily 60 tablet 5    MULTIPLE VITAMIN PO Take 1 tablet by mouth      sertraline (ZOLOFT) 50 MG tablet Take 1 tablet (50 mg) by mouth daily 90 tablet 3    tamsulosin (FLOMAX) 0.4 MG capsule Take 1 capsule (0.4 mg) by mouth daily. 90 capsule 4    Vitamin D3 (CHOLECALCIFEROL) 125 MCG (5000 UT) tablet Take 5,000 Units by mouth      zinc gluconate 50 MG tablet Take 50 mg by mouth daily       No Known Allergies     Family and Social  "History   Family History   Problem Relation Age of Onset    Heart Failure Mother     Coronary Artery Disease Father     Hypertension Father     Hyperlipidemia Father     Atrial fibrillation Father     No Known Problems Sister     No Known Problems Sister     No Known Problems Brother     No Known Problems Brother     No Known Problems Daughter     No Known Problems Son     No Known Problems Son     No Known Problems Son         Social History     Tobacco Use    Smoking status: Former     Current packs/day: 0.00     Types: Cigarettes     Quit date: 2024     Years since quittin.3    Smokeless tobacco: Never    Tobacco comments:     Quit    Substance Use Topics    Alcohol use: Yes     Comment: rare    Drug use: Never        Physical Exam   General Appearance:   No acute distress    /79 (BP Location: Left arm, Patient Position: Sitting, Cuff Size: Adult Regular)   Pulse 68   Temp 97.3  F (36.3  C) (Tympanic)   Resp 12   Ht 1.867 m (6' 1.5\")   Wt 96.8 kg (213 lb 6.4 oz)   SpO2 98%   BMI 27.77 kg/m      EYES: Eyelids, conjunctiva, and sclera were normal.   HEAD, EARS, NOSE, MOUTH, AND THROAT: Head and face were normal. Hearing was normal to voice and the ears were normal to external exam. Nose appearance was normal and there was no discharge.   NECK: Neck appearance was normal.   RESPIRATORY: Breathing pattern was normal and the chest moved symmetrically.  Lung sounds were normal and there were no abnormal sounds.  CARDIOVASCULAR: Heart rate and rhythm were normal.  S1 and S2 were normal and there were no extra sounds or murmurs. There was no peripheral edema.  GASTROINTESTINAL: The abdomen was normal in contour. e patient was alert and oriented to person, place, time, and circumstance. Speech was normal. Cranial nerves were normal. Motor strength was normal for age. The patient was normally coordinated.  PSYCHIATRIC:  Mood and affect were normal and the patient had normal recent and remote " memory. The patient's judgment and insight were normal.       Additional Information        Cam Hill MD  Internal Medicine  Contact me at [phone]

## 2025-01-05 NOTE — PROGRESS NOTES
NEUROLOGY FOLLOW UP VISIT  NOTE       [facility] [place]  [address] [place] [address]  Tel: [phone]  Fax: [phone]  [email or web address]     Olegario Grady,  1960, MRN [id number]  PCP: Cam Hill  Date: 2025      ASSESSMENT & PLAN     Visit Diagnosis  Relapsing remitting multiple sclerosis (H)     Relapsing remitting multiple sclerosis  64-year-old male with history of depression, relapsing remitting multiple sclerosis who returns for yearly follow-up.  He has remained symptom-free.  He had a repeat MRI brain, cervical and thoracic spine last year that did not show any enhancing lesions.  Ever since the diagnosis was made he had very few relapses and might be a candidate for Copaxone taper in the next couple of years.  I have recommended:    1.  Continue Copaxone 40 mg subcu 3 times weekly, Provigil 200 mg twice daily and Zoloft 50 mg daily.  Prescriptions were filled  2.  He will continue on vitamin D supplements  3.  Follow-up in 1 year.  4.  If he remains symptom-free, in 2 years we can consider tapering him off Copaxone and then do yearly imaging study to ensure stability    Thank you again for this referral, please feel free to contact me if you have any questions.    Ross Cabello MD  [facility], [place]     HISTORY OF PRESENT ILLNESS     Patient is a pleasant 64-year-old gentleman with history of depression, relapsing remitting multiple sclerosis who returns for yearly follow-up.  He was last seen on 2024 and had a repeat MRI brain, cervical and thoracic spine were repeated that showed chronic changes but no acute airway demyelination.  In the brain there was one questionable area in the right cerebellum that appeared somewhat bigger compared to previous examination and could be an artifact versus a new area of demyelination.  No change in the dose of Copaxone was made.  In addition he takes Provigil and Zoloft.   Since his last visit he denies any flareups.  Although Provigil is prescribed twice a day at times he only takes once a day.  Ever since he was diagnosed with multiple sclerosis he had very few relapses    Previously he had developed some lower back pain and had MRI that showed severe L3-L4 stenosis and had decompressive surgery done.  Since his last visit he reports no significant change in his symptoms.      PROBLEM LIST   Patient Active Problem List   Diagnosis    Relapsing remitting multiple sclerosis (H)    Recurrent major depressive disorder, in full remission    Benign prostatic hyperplasia with urinary frequency    Mixed hyperlipidemia         PAST MEDICAL & SURGICAL HISTORY     Past Medical History:   Patient  has no past medical history on file.    Surgical History:  He  has a past surgical history that includes Laminectomy lumbar one level; Fusion lumbar anterior one level; IR Lumbar Epidural Steroid Injection (9/13/2011); Posterior Laminectomy / Decompression Lumbar Spine; Lumbar Fusion; Arthroscopy shoulder rotator cuff repair (Right); Orif Femur Fracture (Right); and Lumbar Laminectomy (Bilateral, 2/26/2019).     SOCIAL HISTORY     Reviewed, and he  reports that he quit smoking about 4 months ago. His smoking use included cigarettes. He has never used smokeless tobacco. He reports current alcohol use. He reports that he does not use drugs.     FAMILY HISTORY     Reviewed, and family history includes Atrial fibrillation in his father; Coronary Artery Disease in his father; Heart Failure in his mother; Hyperlipidemia in his father; Hypertension in his father; No Known Problems in his brother, brother, daughter, sister, sister, son, son, and son.     ALLERGIES     No Known Allergies      REVIEW OF SYSTEMS     A 12 point review of system was performed and was negative except as outlined in the history of present illness.     HOME MEDICATIONS     Current Outpatient Rx   Medication Sig Dispense Refill     "atorvastatin (LIPITOR) 20 MG tablet Take 1 tablet (20 mg) by mouth daily 90 tablet 3    [employer] (VITAMIN C) CHEW       [START ON 1/10/2025] glatiramer acetate 40 MG/ML injection Inject 40 mg subcutaneously three times a week. 12 mL 11    modafinil (PROVIGIL) 200 MG tablet Take 1 tablet (200 mg) by mouth 2 times daily. 60 tablet 5    MULTIPLE VITAMIN PO Take 1 tablet by mouth      sertraline (ZOLOFT) 50 MG tablet Take 1 tablet (50 mg) by mouth daily. 90 tablet 3    tamsulosin (FLOMAX) 0.4 MG capsule Take 1 capsule (0.4 mg) by mouth daily. 90 capsule 4    Vitamin D3 (CHOLECALCIFEROL) 125 MCG (5000 UT) tablet Take 5,000 Units by mouth      zinc gluconate 50 MG tablet Take 50 mg by mouth daily           PHYSICAL EXAM     Vital signs  /78 (BP Location: Right arm, Patient Position: Sitting)   Pulse 71   Ht 1.854 m (6' 1\")   Wt 96.6 kg (213 lb)   BMI 28.10 kg/m      Weight:   213 lbs 0 oz    Patient is alert and oriented x4 in no acute distress. Vital signs were reviewed and are documented in electronic medical record. Neck was supple, no carotid bruits, thyromegaly, JVD, or lymphadenopathy was noted.   NEUROLOGY EXAM:   Patient s speech was normal with no aphasia or dysarthria. Mentation, and affect were also normal.    Funduscopic exam was normal, with normal cup to disc ratio. Cranial nerves II -XII were intact.    Patient had normal mass, tone and motor strength was 5/5 in all extremities without pronator drift.    Sensation was intact to light touch, pinprick, and vibratory sensation.    Reflexes were 2+ symmetrical with downgoing toes.    No dysmetria noted on FNF or HKS. Romberg was negative.   Gait testing was normal. Able to walk on toes/heels      PERTINENT DIAGNOSTIC STUDIES     Following studies were reviewed:     MRI BRAIN 2/7/2024  1.  Bilateral cerebral hemisphere white matter demonstrates a mild burden of white matter lesions similar compared to prior MRI brain 07/09/2021. Findings " are nonspecific although compatible with the clinical history of demyelination and multiple   sclerosis. A component of mild chronic microvascular disease likely also present.     2.  Right medial cerebellum small focus of increased FLAIR signal new compared to prior examination may reflect artifact or new small demyelinating lesion.     3.  No intracranial pathologic enhancement to suggest active demyelination.     MRI CERVICAL SPINE 2/7/2024  1.  Right lateral C5-C6 spinal cord small focus of increased T2/GRE signal similar in size compared to prior exam. Findings are nonspecific although compatible with the clinical history of demyelination and multiple sclerosis.   2.  No new cord lesions identified.   3.  No cord enhancement to suggest active demyelination.  4. Multilevel spondylosis described above.  5.  No significant thecal sac stenosis.  6.  Multilevel severe foraminal stenosis described above.  7.  One of the muscles of the left rotator cuff demonstrates a small lesion possibly partially visualized joint effusion, bursitis, or atypical cyst. This is suboptimally evaluated. Consider MRI left shoulder with and without IV contrast as clinically   warranted.    MRI THORACIC SPINE 2/7/2024  1.  Thoracic spinal cord demonstrates multifocal patchy foci of increased T2/STIR signal similar compared to MRI thoracic spine 07/09/2021 nonspecific although compatible with the clinical history of demyelination and multiple sclerosis.   2.  No definite new cord lesions identified.   3.  No cord enhancement to suggest active demyelination.  4.  Multilevel spondylosis described above.  5.  No critical thecal sac stenosis.  6.  T8 and T9 vertebral bodies demonstrate mild chronic compression deformities.   7.  No significant interval change compared to MRI thoracic spine 07/09/2021.    MRI BRAIN, CERVICAL AND THORACIC SPINE 7/12/2021  MRI BRAIN:  1.  Small nonspecific foci of nonenhancing T2 hyperintensities scattered within  the supratentorial brain parenchyma are unchanged versus 2018.  2.  No enhancing plaques or other findings to suggest active demyelination.  3.  No acute intracranial process.     MRI CERVICAL SPINE:  1.  Possible subtle focus of chronic demyelination within the right lateral cervical spinal cord at C5-C6 level unchanged versus prior.  2.  No definite new spinal cord signal abnormality to indicate interval demyelination since 2018.  3.  No pathologic enhancement to indicate active demyelination within the cervical spinal cord.  4.  Degenerative changes without central spinal canal stenosis. Edema associated with right-sided C2-C3 facet arthropathy. Up to mild to moderate neural foraminal narrowing on the right at C3-C4. Mild foraminal narrowing elsewhere.     MRI THORACIC SPINE:  1.  Unchanged chronic demyelinating plaques within the thoracic spinal cord.  2.  No new lesion to indicate interval demyelination since 2018. No pathologic enhancement to indicate active demyelination within the thoracic spinal cord.     LUMBAR  SPINE 2/1/19  1. Canal decompression and posterior instrumented fusion L4-L5. Canal is   well decompressed dorsally without canal stenosis. Neural foramina are   partially obscured without findings for high-grade foraminal stenosis.    2. Although the neural foramina are significantly obscured by   susceptibility artifact at L5-S1, there appears to be severe right and   moderate left neural foraminal stenosis at this level.     3. Severe spinal canal stenosis L3-L4 with left greater than right   lateral recess stenosis. Severe left and more moderate right neural   foraminal stenosis.     4. At L2-L3, there is moderate spinal canal stenosis without significant   foraminal stenosis.    MRI BRAIN: 8/30/18  1. A few small nonspecific white matter hyperintensities are new versus   the prior exam but do not enhance compatible with areas of now chronic   demyelination that have occurred in the interval.  No pathologic   enhancement to indicate active demyelination   in the brain.  2. No superimposed acute intracranial finding.    MRI CERVICAL SPINE 8/30/2018  1. Small focus of nonenhancing T2 hyperintensity in the right spinal cord   at the C5-C6 level compatible with focus of chronic demyelination is   unchanged from prior. No new spinal cord signal abnormality. No pathologic   enhancement.    MRI THORACIC SPINE 8/30/2018  1. Multiple patchy areas of nonenhancing T2 signal hyperintensity within   the thoracic spinal cord are not definitely changed versus prior.  2. No pathologic enhancement to indicate active demyelination in the   thoracic spinal cord.        PERTINENT LABS  Following labs were reviewed:  Office Visit on 12/19/2024   Component Date Value Ref Range Status    WBC Count 12/19/2024 6.5  4.0 - 11.0 10e3/uL Final    RBC Count 12/19/2024 5.17  4.40 - 5.90 10e6/uL Final    Hemoglobin 12/19/2024 15.3  13.3 - 17.7 g/dL Final    Hematocrit 12/19/2024 46.2  40.0 - 53.0 % Final    MCV 12/19/2024 89  78 - 100 fL Final    MCH 12/19/2024 29.6  26.5 - 33.0 pg Final    MCHC 12/19/2024 33.1  31.5 - 36.5 g/dL Final    RDW 12/19/2024 13.1  10.0 - 15.0 % Final    Platelet Count 12/19/2024 236  150 - 450 10e3/uL Final    Sodium 12/19/2024 144  135 - 145 mmol/L Final    Potassium 12/19/2024 4.4  3.4 - 5.3 mmol/L Final    Carbon Dioxide (CO2) 12/19/2024 27  22 - 29 mmol/L Final    Anion Gap 12/19/2024 10  7 - 15 mmol/L Final    Urea Nitrogen 12/19/2024 17.8  8.0 - 23.0 mg/dL Final    Creatinine 12/19/2024 0.91  0.67 - 1.17 mg/dL Final    GFR Estimate 12/19/2024 >90  >60 mL/min/1.73m2 Final    Calcium 12/19/2024 9.8  8.8 - 10.4 mg/dL Final    Chloride 12/19/2024 107  98 - 107 mmol/L Final    Glucose 12/19/2024 100 (H)  70 - 99 mg/dL Final    Alkaline Phosphatase 12/19/2024 95  40 - 150 U/L Final    AST 12/19/2024 33  0 - 45 U/L Final    ALT 12/19/2024 47  0 - 70 U/L Final    Protein Total 12/19/2024 6.9  6.4 - 8.3 g/dL  Final    Albumin 12/19/2024 4.5  3.5 - 5.2 g/dL Final    Bilirubin Total 12/19/2024 0.5  <=1.2 mg/dL Final    Patient Fasting > 8hrs? 12/19/2024 Yes   Final    Estimated Average Glucose 12/19/2024 108  <117 mg/dL Final    Hemoglobin A1C 12/19/2024 5.4  0.0 - 5.6 % Final    Cholesterol 12/19/2024 161  <200 mg/dL Final    Triglycerides 12/19/2024 76  <150 mg/dL Final    Direct Measure HDL 12/19/2024 55  >=40 mg/dL Final    LDL Cholesterol Calculated 12/19/2024 91  <100 mg/dL Final    Non HDL Cholesterol 12/19/2024 106  <130 mg/dL Final    Patient Fasting > 8hrs? 12/19/2024 Yes   Final    Prostate Specific Antigen Screen 12/19/2024 0.97  0.00 - 4.50 ng/mL Final    TSH 12/19/2024 2.30  0.30 - 4.20 uIU/mL Final    Color Urine 12/19/2024 Yellow  Colorless, Straw, Light Yellow, Yellow Final    Appearance Urine 12/19/2024 Clear  Clear Final    Glucose Urine 12/19/2024 Negative  Negative mg/dL Final    Bilirubin Urine 12/19/2024 Negative  Negative Final    Ketones Urine 12/19/2024 Negative  Negative mg/dL Final    Specific Gravity Urine 12/19/2024 1.025  1.005 - 1.030 Final    Blood Urine 12/19/2024 Negative  Negative Final    pH Urine 12/19/2024 6.0  5.0 - 8.0 Final    Protein Albumin Urine 12/19/2024 Negative  Negative mg/dL Final    Urobilinogen Urine 12/19/2024 0.2  0.2, 1.0 E.U./dL Final    Nitrite Urine 12/19/2024 Negative  Negative Final    Leukocyte Esterase Urine 12/19/2024 Negative  Negative Final         Total time spent for face to face visit, reviewing labs/imaging studies, counseling and coordination of care was: 30 Minutes spent on the date of the encounter doing chart review, review of outside records, review of test results, interpretation of tests, patient visit, and documentation     The longitudinal plan of care for the diagnosis(es)/condition(s) as documented were addressed during this visit. Due to the added complexity in care, I will continue to support Olegario in the subsequent management and with  ongoing continuity of care.    This note was dictated using voice recognition software.  Any grammatical or context distortions are unintentional and inherent to the software.    No orders of the defined types were placed in this encounter.     New Prescriptions    No medications on file     Modified Medications    Modified Medication Previous Medication    GLATIRAMER ACETATE 40 MG/ML INJECTION glatiramer acetate 40 MG/ML injection       Inject 40 mg subcutaneously three times a week.    Inject 40 mg subcutaneously three times a week.    MODAFINIL (PROVIGIL) 200 MG TABLET modafinil (PROVIGIL) 200 MG tablet       Take 1 tablet (200 mg) by mouth 2 times daily.    Take 1 tablet (200 mg) by mouth 2 times daily    SERTRALINE (ZOLOFT) 50 MG TABLET sertraline (ZOLOFT) 50 MG tablet       Take 1 tablet (50 mg) by mouth daily.    Take 1 tablet (50 mg) by mouth daily

## 2025-01-09 ENCOUNTER — OFFICE VISIT (OUTPATIENT)
Dept: NEUROLOGY | Facility: CLINIC | Age: 65
End: 2025-01-09
Payer: COMMERCIAL

## 2025-01-09 VITALS
HEART RATE: 71 BPM | DIASTOLIC BLOOD PRESSURE: 78 MMHG | SYSTOLIC BLOOD PRESSURE: 123 MMHG | HEIGHT: 73 IN | BODY MASS INDEX: 28.23 KG/M2 | WEIGHT: 213 LBS

## 2025-01-09 DIAGNOSIS — G35 RELAPSING REMITTING MULTIPLE SCLEROSIS (H): Primary | ICD-10-CM

## 2025-01-09 RX ORDER — MODAFINIL 200 MG/1
200 TABLET ORAL 2 TIMES DAILY
Qty: 60 TABLET | Refills: 5 | Status: SHIPPED | OUTPATIENT
Start: 2025-01-09

## 2025-01-09 RX ORDER — GLATIRAMER 40 MG/ML
40 INJECTION, SOLUTION SUBCUTANEOUS
Qty: 12 ML | Refills: 11 | Status: SHIPPED | OUTPATIENT
Start: 2025-01-10

## 2025-01-09 NOTE — LETTER
2025      Olegario Grady  [address] [facility] [place] [address]      Dear Colleague,    Thank you for referring your patient, Olegario Grady, to the [facility] [place]. Please see a copy of my visit note below.    NEUROLOGY FOLLOW UP VISIT  NOTE       [facility] NEUROLOGY [place]  [address] [place] [address]  Tel: [phone]  Fax: [phone]  [email or web address]     Olegario Grady,  1960, MRN [id number]  PCP: [place]  Date: 2025      ASSESSMENT & PLAN     Visit Diagnosis  Relapsing remitting multiple sclerosis (H)     Relapsing remitting multiple sclerosis  64-year-old male with history of depression, relapsing remitting multiple sclerosis who returns for yearly follow-up.  He has remained symptom-free.  He had a repeat MRI brain, cervical and thoracic spine last year that did not show any enhancing lesions.  Ever since the diagnosis was made he had very few relapses and might be a candidate for Copaxone taper in the next couple of years.  I have recommended:    1.  Continue Copaxone 40 mg subcu 3 times weekly, Provigil 200 mg twice daily and Zoloft 50 mg daily.  Prescriptions were filled  2.  He will continue on vitamin D supplements  3.  Follow-up in 1 year.  4.  If he remains symptom-free, in 2 years we can consider tapering him off Copaxone and then do yearly imaging study to ensure stability    Thank you again for this referral, please feel free to contact me if you have any questions.    Ross Cabello MD  [facility] NEUROLOGY, [place]     HISTORY OF PRESENT ILLNESS     Patient is a pleasant 64-year-old gentleman with history of depression, relapsing remitting multiple sclerosis who returns for yearly follow-up.  He was last seen on 2024 and had a repeat MRI brain, cervical and thoracic spine were repeated that showed chronic changes but no acute airway demyelination.  In the brain there was one  questionable area in the right cerebellum that appeared somewhat bigger compared to previous examination and could be an artifact versus a new area of demyelination.  No change in the dose of Copaxone was made.  In addition he takes Provigil and Zoloft.  Since his last visit he denies any flareups.  Although Provigil is prescribed twice a day at times he only takes once a day.  Ever since he was diagnosed with multiple sclerosis he had very few relapses    Previously he had developed some lower back pain and had MRI that showed severe L3-L4 stenosis and had decompressive surgery done.  Since his last visit he reports no significant change in his symptoms.      PROBLEM LIST   Patient Active Problem List   Diagnosis     Relapsing remitting multiple sclerosis (H)     Recurrent major depressive disorder, in full remission     Benign prostatic hyperplasia with urinary frequency     Mixed hyperlipidemia         PAST MEDICAL & SURGICAL HISTORY     Past Medical History:   Patient  has no past medical history on file.    Surgical History:  He  has a past surgical history that includes Laminectomy lumbar one level; Fusion lumbar anterior one level; IR Lumbar Epidural Steroid Injection (9/13/2011); Posterior Laminectomy / Decompression Lumbar Spine; Lumbar Fusion; Arthroscopy shoulder rotator cuff repair (Right); Orif Femur Fracture (Right); and Lumbar Laminectomy (Bilateral, 2/26/2019).     SOCIAL HISTORY     Reviewed, and he  reports that he quit smoking about 4 months ago. His smoking use included cigarettes. He has never used smokeless tobacco. He reports current alcohol use. He reports that he does not use drugs.     FAMILY HISTORY     Reviewed, and family history includes Atrial fibrillation in his father; Coronary Artery Disease in his father; Heart Failure in his mother; Hyperlipidemia in his father; Hypertension in his father; No Known Problems in his brother, brother, daughter, sister, sister, son, son, and son.  "    ALLERGIES     No Known Allergies      REVIEW OF SYSTEMS     A 12 point review of system was performed and was negative except as outlined in the history of present illness.     HOME MEDICATIONS     Current Outpatient Rx   Medication Sig Dispense Refill     atorvastatin (LIPITOR) 20 MG tablet Take 1 tablet (20 mg) by mouth daily 90 tablet 3     Bioflavonoid Products (VITAMIN C) CHEW        [START ON 1/10/2025] glatiramer acetate 40 MG/ML injection Inject 40 mg subcutaneously three times a week. 12 mL 11     modafinil (PROVIGIL) 200 MG tablet Take 1 tablet (200 mg) by mouth 2 times daily. 60 tablet 5     MULTIPLE VITAMIN PO Take 1 tablet by mouth       sertraline (ZOLOFT) 50 MG tablet Take 1 tablet (50 mg) by mouth daily. 90 tablet 3     tamsulosin (FLOMAX) 0.4 MG capsule Take 1 capsule (0.4 mg) by mouth daily. 90 capsule 4     Vitamin D3 (CHOLECALCIFEROL) 125 MCG (5000 UT) tablet Take 5,000 Units by mouth       zinc gluconate 50 MG tablet Take 50 mg by mouth daily           PHYSICAL EXAM     Vital signs  /78 (BP Location: Right arm, Patient Position: Sitting)   Pulse 71   Ht 1.854 m (6' 1\")   Wt 96.6 kg (213 lb)   BMI 28.10 kg/m      Weight:   213 lbs 0 oz    Patient is alert and oriented x4 in no acute distress. Vital signs were reviewed and are documented in electronic medical record. Neck was supple, no carotid bruits, thyromegaly, JVD, or lymphadenopathy was noted.   NEUROLOGY EXAM:    Patient s speech was normal with no aphasia or dysarthria. Mentation, and affect were also normal.     Funduscopic exam was normal, with normal cup to disc ratio. Cranial nerves II -XII were intact.     Patient had normal mass, tone and motor strength was 5/5 in all extremities without pronator drift.     Sensation was intact to light touch, pinprick, and vibratory sensation.     Reflexes were 2+ symmetrical with downgoing toes.     No dysmetria noted on FNF or HKS. Romberg was negative.    Gait testing was normal. " Able to walk on toes/heels      PERTINENT DIAGNOSTIC STUDIES     Following studies were reviewed:     MRI BRAIN 2/7/2024  1.  Bilateral cerebral hemisphere white matter demonstrates a mild burden of white matter lesions similar compared to prior MRI brain 07/09/2021. Findings are nonspecific although compatible with the clinical history of demyelination and multiple   sclerosis. A component of mild chronic microvascular disease likely also present.     2.  Right medial cerebellum small focus of increased FLAIR signal new compared to prior examination may reflect artifact or new small demyelinating lesion.     3.  No intracranial pathologic enhancement to suggest active demyelination.     MRI CERVICAL SPINE 2/7/2024  1.  Right lateral C5-C6 spinal cord small focus of increased T2/GRE signal similar in size compared to prior exam. Findings are nonspecific although compatible with the clinical history of demyelination and multiple sclerosis.   2.  No new cord lesions identified.   3.  No cord enhancement to suggest active demyelination.  4. Multilevel spondylosis described above.  5.  No significant thecal sac stenosis.  6.  Multilevel severe foraminal stenosis described above.  7.  One of the muscles of the left rotator cuff demonstrates a small lesion possibly partially visualized joint effusion, bursitis, or atypical cyst. This is suboptimally evaluated. Consider MRI left shoulder with and without IV contrast as clinically   warranted.    MRI THORACIC SPINE 2/7/2024  1.  Thoracic spinal cord demonstrates multifocal patchy foci of increased T2/STIR signal similar compared to MRI thoracic spine 07/09/2021 nonspecific although compatible with the clinical history of demyelination and multiple sclerosis.   2.  No definite new cord lesions identified.   3.  No cord enhancement to suggest active demyelination.  4.  Multilevel spondylosis described above.  5.  No critical thecal sac stenosis.  6.  T8 and T9 vertebral  bodies demonstrate mild chronic compression deformities.   7.  No significant interval change compared to MRI thoracic spine 07/09/2021.    MRI BRAIN, CERVICAL AND THORACIC SPINE 7/12/2021  MRI BRAIN:  1.  Small nonspecific foci of nonenhancing T2 hyperintensities scattered within the supratentorial brain parenchyma are unchanged versus 2018.  2.  No enhancing plaques or other findings to suggest active demyelination.  3.  No acute intracranial process.     MRI CERVICAL SPINE:  1.  Possible subtle focus of chronic demyelination within the right lateral cervical spinal cord at C5-C6 level unchanged versus prior.  2.  No definite new spinal cord signal abnormality to indicate interval demyelination since 2018.  3.  No pathologic enhancement to indicate active demyelination within the cervical spinal cord.  4.  Degenerative changes without central spinal canal stenosis. Edema associated with right-sided C2-C3 facet arthropathy. Up to mild to moderate neural foraminal narrowing on the right at C3-C4. Mild foraminal narrowing elsewhere.     MRI THORACIC SPINE:  1.  Unchanged chronic demyelinating plaques within the thoracic spinal cord.  2.  No new lesion to indicate interval demyelination since 2018. No pathologic enhancement to indicate active demyelination within the thoracic spinal cord.     LUMBAR  SPINE 2/1/19  1. Canal decompression and posterior instrumented fusion L4-L5. Canal is   well decompressed dorsally without canal stenosis. Neural foramina are   partially obscured without findings for high-grade foraminal stenosis.    2. Although the neural foramina are significantly obscured by   susceptibility artifact at L5-S1, there appears to be severe right and   moderate left neural foraminal stenosis at this level.     3. Severe spinal canal stenosis L3-L4 with left greater than right   lateral recess stenosis. Severe left and more moderate right neural   foraminal stenosis.     4. At L2-L3, there is moderate  spinal canal stenosis without significant   foraminal stenosis.    MRI BRAIN: 8/30/18  1. A few small nonspecific white matter hyperintensities are new versus   the prior exam but do not enhance compatible with areas of now chronic   demyelination that have occurred in the interval. No pathologic   enhancement to indicate active demyelination   in the brain.  2. No superimposed acute intracranial finding.    MRI CERVICAL SPINE 8/30/2018  1. Small focus of nonenhancing T2 hyperintensity in the right spinal cord   at the C5-C6 level compatible with focus of chronic demyelination is   unchanged from prior. No new spinal cord signal abnormality. No pathologic   enhancement.    MRI THORACIC SPINE 8/30/2018  1. Multiple patchy areas of nonenhancing T2 signal hyperintensity within   the thoracic spinal cord are not definitely changed versus prior.  2. No pathologic enhancement to indicate active demyelination in the   thoracic spinal cord.        PERTINENT LABS  Following labs were reviewed:  Office Visit on 12/19/2024   Component Date Value Ref Range Status     WBC Count 12/19/2024 6.5  4.0 - 11.0 10e3/uL Final     RBC Count 12/19/2024 5.17  4.40 - 5.90 10e6/uL Final     Hemoglobin 12/19/2024 15.3  13.3 - 17.7 g/dL Final     Hematocrit 12/19/2024 46.2  40.0 - 53.0 % Final     MCV 12/19/2024 89  78 - 100 fL Final     MCH 12/19/2024 29.6  26.5 - 33.0 pg Final     MCHC 12/19/2024 33.1  31.5 - 36.5 g/dL Final     RDW 12/19/2024 13.1  10.0 - 15.0 % Final     Platelet Count 12/19/2024 236  150 - 450 10e3/uL Final     Sodium 12/19/2024 144  135 - 145 mmol/L Final     Potassium 12/19/2024 4.4  3.4 - 5.3 mmol/L Final     Carbon Dioxide (CO2) 12/19/2024 27  22 - 29 mmol/L Final     Anion Gap 12/19/2024 10  7 - 15 mmol/L Final     Urea Nitrogen 12/19/2024 17.8  8.0 - 23.0 mg/dL Final     Creatinine 12/19/2024 0.91  0.67 - 1.17 mg/dL Final     GFR Estimate 12/19/2024 >90  >60 mL/min/1.73m2 Final     Calcium 12/19/2024 9.8  8.8 - 10.4  mg/dL Final     Chloride 12/19/2024 107  98 - 107 mmol/L Final     Glucose 12/19/2024 100 (H)  70 - 99 mg/dL Final     Alkaline Phosphatase 12/19/2024 95  40 - 150 U/L Final     AST 12/19/2024 33  0 - 45 U/L Final     ALT 12/19/2024 47  0 - 70 U/L Final     Protein Total 12/19/2024 6.9  6.4 - 8.3 g/dL Final     Albumin 12/19/2024 4.5  3.5 - 5.2 g/dL Final     Bilirubin Total 12/19/2024 0.5  <=1.2 mg/dL Final     Patient Fasting > 8hrs? 12/19/2024 Yes   Final     Estimated Average Glucose 12/19/2024 108  <117 mg/dL Final     Hemoglobin A1C 12/19/2024 5.4  0.0 - 5.6 % Final     Cholesterol 12/19/2024 161  <200 mg/dL Final     Triglycerides 12/19/2024 76  <150 mg/dL Final     Direct Measure HDL 12/19/2024 55  >=40 mg/dL Final     LDL Cholesterol Calculated 12/19/2024 91  <100 mg/dL Final     Non HDL Cholesterol 12/19/2024 106  <130 mg/dL Final     Patient Fasting > 8hrs? 12/19/2024 Yes   Final     Prostate Specific Antigen Screen 12/19/2024 0.97  0.00 - 4.50 ng/mL Final     TSH 12/19/2024 2.30  0.30 - 4.20 uIU/mL Final     Color Urine 12/19/2024 Yellow  Colorless, Straw, Light Yellow, Yellow Final     Appearance Urine 12/19/2024 Clear  Clear Final     Glucose Urine 12/19/2024 Negative  Negative mg/dL Final     Bilirubin Urine 12/19/2024 Negative  Negative Final     Ketones Urine 12/19/2024 Negative  Negative mg/dL Final     Specific Gravity Urine 12/19/2024 1.025  1.005 - 1.030 Final     Blood Urine 12/19/2024 Negative  Negative Final     pH Urine 12/19/2024 6.0  5.0 - 8.0 Final     Protein Albumin Urine 12/19/2024 Negative  Negative mg/dL Final     Urobilinogen Urine 12/19/2024 0.2  0.2, 1.0 E.U./dL Final     Nitrite Urine 12/19/2024 Negative  Negative Final     Leukocyte Esterase Urine 12/19/2024 Negative  Negative Final         Total time spent for face to face visit, reviewing labs/imaging studies, counseling and coordination of care was: 30 Minutes spent on the date of the encounter doing chart review, review of  outside records, review of test results, interpretation of tests, patient visit, and documentation     The longitudinal plan of care for the diagnosis(es)/condition(s) as documented were addressed during this visit. Due to the added complexity in care, I will continue to support Olegario in the subsequent management and with ongoing continuity of care.    This note was dictated using voice recognition software.  Any grammatical or context distortions are unintentional and inherent to the software.    No orders of the defined types were placed in this encounter.     New Prescriptions    No medications on file     Modified Medications    Modified Medication Previous Medication    GLATIRAMER ACETATE 40 MG/ML INJECTION glatiramer acetate 40 MG/ML injection       Inject 40 mg subcutaneously three times a week.    Inject 40 mg subcutaneously three times a week.    MODAFINIL (PROVIGIL) 200 MG TABLET modafinil (PROVIGIL) 200 MG tablet       Take 1 tablet (200 mg) by mouth 2 times daily.    Take 1 tablet (200 mg) by mouth 2 times daily    SERTRALINE (ZOLOFT) 50 MG TABLET sertraline (ZOLOFT) 50 MG tablet       Take 1 tablet (50 mg) by mouth daily.    Take 1 tablet (50 mg) by mouth daily                 Again, thank you for allowing me to participate in the care of your patient.        Sincerely,        Ross Cabello MD    Electronically signed

## 2025-01-09 NOTE — NURSING NOTE
Chief Complaint   Patient presents with    Multiple Sclerosis     Annual follow up- no new concerns      Dorie ROSS CMA on 1/9/2025 at 8:52 AM  [facility]

## 2025-01-28 ENCOUNTER — ANCILLARY PROCEDURE (OUTPATIENT)
Dept: BONE DENSITY | Facility: CLINIC | Age: 65
End: 2025-01-28
Attending: INTERNAL MEDICINE
Payer: COMMERCIAL

## 2025-01-28 ENCOUNTER — HOSPITAL ENCOUNTER (OUTPATIENT)
Dept: CT IMAGING | Facility: CLINIC | Age: 65
Discharge: HOME | End: 2025-01-28
Attending: INTERNAL MEDICINE
Payer: COMMERCIAL

## 2025-01-28 DIAGNOSIS — Z87.891 PERSONAL HISTORY OF TOBACCO USE: ICD-10-CM

## 2025-01-28 DIAGNOSIS — S22.070S COMPRESSION FRACTURE OF T9 VERTEBRA, SEQUELA: ICD-10-CM

## 2025-01-28 PROCEDURE — 77081 DXA BONE DENSITY APPENDICULR: CPT | Mod: TC | Performed by: PHYSICIAN ASSISTANT

## 2025-01-28 PROCEDURE — 77080 DXA BONE DENSITY AXIAL: CPT | Mod: TC | Performed by: PHYSICIAN ASSISTANT

## 2025-01-28 PROCEDURE — 77091 TBS TECHL CALCULATION ONLY: CPT | Performed by: PHYSICIAN ASSISTANT

## 2025-01-28 PROCEDURE — 71271 CT THORAX LUNG CANCER SCR C-: CPT
